# Patient Record
(demographics unavailable — no encounter records)

---

## 2017-01-04 NOTE — EDDOCDS
Nurse's Notes                                                                                     

[facility]                                                                         

Name: Sami Claros                                                                                 

Age: 45 yrs                                                                                       

Sex: Male                                                                                         

: 1971                                                                                   

MRN: [id number]                                                                                     

Arrival Date: 2017                                                                          

Time: 11:51                                                                                       

Account#: [id number]                                                                              

Bed PD                                                                                      

Private MD: [facility]                                                                  

Diagnosis: Moderate persistent asthma with (acute) exacerbation                                   

                                                                                                  

Presentation:                                                                                     

                                                                                             

11:53 Presenting complaint: Patient states: difficultly breathing and cough for a couple of   srm 

      days. hx of asthma. Presenting complaint: Patient states: nom fevers at home. Adult         

      Sepsis Screening: The patient does not have new or worsening altered mentation.             

      Patient's respiratory rate is less than 22. Systolic blood pressure is greater than         

      100. Patient has a qSOFA score of 0- Negative Sepsis Screen. Suicide/Homicide risk          

      assessment- the patient denies having any suicidal and/or homicidal ideations and does      

      not present with any other emotional, behavioral or mental health complaints.       

      Status: Patient is not a  or  dependent. Transition of care:          

      patient was not received from another setting of care.                                      

11:53 Acuity: JIM Level 4                                                                     [facility] 

11:53 Method Of Arrival: Walkin/Carried/Asstd                                                 [facility] 

                                                                                                  

Triage Assessment:                                                                                

11:55 General: Appears in no apparent distress, Behavior is appropriate for age, cooperative. srm 

      Pain: Pain currently is 5 out of 10 on a pain scale. HIV screening NA for this visit        

      Offered previously. Respiratory: Airway is patent Respiratory effort is even,               

      unlabored, Breath sounds are diminished bilaterally. dry cough.                             

                                                                                                  

Historical:                                                                                       

- Allergies: no known allergies;                                                                  

- Home Meds:                                                                                      

1. albuterol sulfate 90 mcg/actuation Inhl HFAA 1 puff every 4 hours as needed                  

2. Albuterol-Ipratropium Inhl 3 mL as needed                                                    

     (Last dose: 2017 08:30)                                                                

3. Calcium with D 2 tab nightly                                                                 

4. Flonase 50 mcg/actuation Nasal spsn 1 spray 2 times per day                                  

5. Qvar inhalation 1 puff 2 times per day                                                       

6. Singulair 10 mg Oral tab 1 tab once daily                                                    

7. Symbicort INH 2 puff daily                                                                   

8. Zyrtec 10 mg Oral tab 1 tab once daily                                                       

- PMHx: Asthma; Seasonal Allergies; Sleep Apnea w/ CPAP;                                          

- PSHx: Tonsillectomy; Gastric Bypass; Vasectomy; Left Shoulder Surgery; Left Ankle               

Surgery;                                                                                        

- Social history: Smoking status: Patient states was never smoker of tobacco. No                  

barriers to communication noted, The patient speaks fluent English, Speaks                      

appropriately for age.                                                                          

- Family history: Not pertinent.                                                                  

- : The pt / caregiver states he / she is not on anticoagulants. Home medication list             

is obtained from the patient.                                                                   

- Exposure Risk Screening:: None identified.                                                      

                                                                                                  

                                                                                                  

Screenin:36 Screening information is obtained from the patient. Fall risk: No risks identified.     ttb 

      Assistance ADL's: requires no assistance with activities of daily living. Abuse/DV          

      Screen: The patient / caregiver reports he/she is: not in a situation that causes fear,     

      pain or injury. Nutritional screening: No deficits noted. Advance Directives:               

      Currently, there is no health care proxy. home support is adequate.                         

                                                                                                  

Assessment:                                                                                       

14:36 Adult Sepsis Screening: The patient does not have new or worsening altered mentation.   ttb 

      Systolic blood pressure is greater than 100. Patient has a qSOFA score of 0- Negative       

      Sepsis Screen. General: Appears in no apparent distress, well nourished, well groomed,      

      Behavior is appropriate for age, cooperative. Pain: Denies pain. Neurological: Level of     

      Consciousness is awake, alert. EENT: Denies nasal congestion, nasal discharge.              

      Cardiovascular: Chest pain is denied. Respiratory: Airway is patent Respiratory effort      

      is even, unlabored, Denies shortness of breath. GI: Denies nausea, vomiting, pain.          

      Derm: Skin is normal.                                                                       

                                                                                                  

Vital Signs:                                                                                      

11:52  / 74; Pulse 60; Resp 24 S; Temp 97.3(O); Pulse Ox 100% on R/A; Weight 136.08 kg  dd6 

      (R); Height 6 ft. 0 in. (182.88 cm) (R);                                                    

14:33  / 59; Pulse 65; Resp 18; Temp 96.3(O); Pulse Ox 100% on R/A; Pain 0/10;          ttb 

11:52 Body Mass Index 40.69 (136.08 kg, 182.88 cm)                                            dd6 

                                                                                                  

Vitals:                                                                                           

11:52 Log In Time: 2017 at 11:50.                                                 dd6 

                                                                                                  

ED Course:                                                                                        

11:52 Patient visited by Jose Alberto Gillespie PCA.                                             dd6 

11:52 [facility] is Private Physician.                                              dd6 

11:52 Patient moved to Waiting                                                                dd6 

11:53 Patient moved to Pre RCE                                                                dd6 

11:54 Triage Initiated                                                                        srm 

12:23 Patient moved to Triage 1                                                               mlb1

12:51 Hai Sánchez PA is PHCP.                                                         btw 

12:51 Lilian Wheeler MD is Attending Physician.                                      btw 

12:51 Patient visited by Hai Sánchez PA.                                              btw 

13:00 Patient moved to PD2 /                                                                jrd 

13:28 Patient visited by Brittney Toure RN.                                                srm 

14:00 Patient visited by Qiana Canales PCA.                                              ct3 

14:31 [facility] is Referral Physician.                                             btw 

14:36 The patient / caregiver is instructed regarding the plan of care and ED course. Patient ttb 

      has correct armband on for positive identification.                                         

14:36 No IV's were initiated during this patient's visit. No procedures done that require     ttb 

      assistance.                                                                                 

                                                                                                  

Administered Medications:                                                                         

13:06 Drug: Albuterol-Ipratropium 1 neb [ipratropium-albuterol 0.5 mg-3 mg(2.5 mg base)/3 mL  kt1 

      nebulization soln (1 neb)] Route: Nebulizer;                                                

13:20 Follow up: Response: Nebulizer completed                                                sd7 

13:19 Drug: methylPREDNISolone Sodium Succinate 125 mg [methylprednisolone sodium succ 125 mg mlb1

      solution for injection (125 mg)] Route: IM; Site: left deltoid;                             

13:26 Drug: Albuterol-Ipratropium 1 neb [ipratropium-albuterol 0.5 mg-3 mg(2.5 mg base)/3 mL  sd7 

      nebulization soln (1 neb)] Route: Nebulizer;                                                

13:43 Follow up: Response: Nebulizer completed                                                sd7 

13:44 Drug: Albuterol-Ipratropium 1 neb [ipratropium-albuterol 0.5 mg-3 mg(2.5 mg base)/3 mL  sd7 

      nebulization soln (1 neb)] Route: Nebulizer;                                                

14:34 Follow up: Response: Nebulizer completed                                                sd7 

                                                                                                  

                                                                                                  

RT:                                                                                               

13:06 Initial Med Neb Given as ordered Patient was instructed and evaluated on procedure.     kt1 

      Respiratory: Breath sounds are clear bilaterally.                                           

13:26 Subsequent Med Neb Given as ordered Patient tolerated procedure well without adverse    sd7 

      effect. Respiratory: Breath sounds are diminished bilaterally. Breath sounds with           

      wheezes bilaterally. at expiration.                                                         

13:44 Subsequent Med Neb Given as ordered Patient tolerated procedure well without adverse    sd7 

      effect. Respiratory: Breath sounds are clear bilaterally.                                   

                                                                                                  

Order Results:                                                                                    

There are currently no results for this order.                                                    

Outcome:                                                                                          

14:31 Discharge ordered by Provider.                                                          btw 

14:36 Discharge Assessment: Patient awake, alert and oriented x 3. No cognitive and/or        ttb 

      functional deficits noted. Patient verbalized understanding of disposition                  

      instructions. Patient awake and alert. patient administered narcotics - no. The             

      following High Risk Discharge criteria are identified: None. Discharged to home             

      ambulatory. Condition: good Condition: stable Condition: improved. Discharge                

      instructions given to patient, Instructed on discharge instructions, follow up and          

      referral plans. medication usage, Demonstrated understanding of instructions,               

      medications, Pt was receptive of discharge instructions/ teaching. Prescriptions given      

      X 1. No special radiology studies were completed. Property :Personal belongings             

      accompany Pt.                                                                               

14:38 Patient left the ED.                                                                    ttb 

                                                                                                  

Signatures:                                                                                       

Brittney Toure RN RN srm Barney, Michael B RN                   RN   mlb1                                                 

Sabrina Rome                           kt1                                                  

Jose Alberto Gillespie, PCA                 PCA  dd6                                                  

Hai Sánchez PA                  PA   btw                                                  

Qiana Canales, PCA                  PCA  ct3                                                  

Ava Oliveros RN RN   ttb                                                  

Rosa M Elias,RT                   RT   sd7                                                  

Feliz Mario, PCA                   PCA  jrd                                                  

                                                                                                  

**************************************************************************************************

MTDD

## 2017-01-04 NOTE — EDDOCDS
Physician Documentation                                                                           

[facility]                                                                         

Name: Sami Claros                                                                                 

Age: 45 yrs                                                                                       

Sex: Male                                                                                         

: 1971                                                                                   

MRN: [id number]                                                                                     

Arrival Date: 2017                                                                          

Time: 11:51                                                                                       

Account#: [id number]                                                                              

Bed PD                                                                                      

Private MD: [facility]                                                                  

Disposition:                                                                                      

17 14:31 Discharged to Home/Self Care. Impression: Moderate persistent asthma with          

(acute) exacerbation.                                                                           

- Condition is Stable.                                                                            

- Discharge Instructions: Asthma, Adult, Asthma, Acute Bronchospasm.                              

- Prescriptions for Medrol (Nain) 4 mg Oral Tablets, Dose Pack - take 1 Pack by ORAL               

route as directed - follow package instructions; 1 packet.                                      

- Medication Reconciliation, Local Pharmacy Hours form.                                           

- Follow up: [facility]; When: 1 - 2 days; Reason: Further diagnostic                   

work-up, Recheck today's complaints, Continuance of care. Follow up: Emergency                  

Department; When: As soon as possible; Reason: Trouble breathing, Worsening of                  

conditions.                                                                                     

- Problem is an acute exacerbation.                                                               

- Symptoms have improved.                                                                         

                                                                                                  

                                                                                                  

                                                                                                  

Historical:                                                                                       

- Allergies: no known allergies;                                                                  

- Home Meds:                                                                                      

1. albuterol sulfate 90 mcg/actuation Inhl HFAA 1 puff every 4 hours as needed                  

2. Albuterol-Ipratropium Inhl 3 mL as needed                                                    

     (Last dose: 2017 08:30)                                                                

3. Calcium with D 2 tab nightly                                                                 

4. Flonase 50 mcg/actuation Nasal spsn 1 spray 2 times per day                                  

5. Qvar inhalation 1 puff 2 times per day                                                       

6. Singulair 10 mg Oral tab 1 tab once daily                                                    

7. Symbicort INH 2 puff daily                                                                   

8. Zyrtec 10 mg Oral tab 1 tab once daily                                                       

- PMHx: Asthma; Seasonal Allergies; Sleep Apnea w/ CPAP;                                          

- PSHx: Tonsillectomy; Gastric Bypass; Vasectomy; Left Shoulder Surgery; Left Ankle               

Surgery;                                                                                        

- Social history: Smoking status: Patient states was never smoker of tobacco. No                  

barriers to communication noted, The patient speaks fluent English, Speaks                      

appropriately for age.                                                                          

- Family history: Not pertinent.                                                                  

- : The pt / caregiver states he / she is not on anticoagulants. Home medication list             

is obtained from the patient.                                                                   

- Exposure Risk Screening:: None identified.                                                      

                                                                                                  

                                                                                                  

Vital Signs:                                                                                      

                                                                                             

11:52  / 74; Pulse 60; Resp 24 S; Temp 97.3(O); Pulse Ox 100% on R/A; Weight 136.08 kg  dd6 

      / 300.01 lbs (R); Height 6 ft. 0 in. (182.88 cm) (R);                                       

14:33  / 59; Pulse 65; Resp 18; Temp 96.3(O); Pulse Ox 100% on R/A; Pain 0/10;          ttb 

11:52 Body Mass Index 40.69 (136.08 kg, 182.88 cm)                                            dd6 

                                                                                                  

MDM:                                                                                              

13:01 methylPREDNISolone Sodium Succinate 125 mg IM once ordered.                             btw 

13:01 Albuterol-Ipratropium 1 neb Nebulizer every 20 minutes x3 ordered.                      btw 

13:01 Call Respiratory ordered.                                                               btw 

13:01 Call Respiratory complete.                                                              kt1 

13:01 Chest, 2 View (pa\E\lat) Ordered.                                                         EDMS

14:25 Financial registration complete.                                                        lg  

                                                                                                  

Administered Medications:                                                                         

13:06 Drug: Albuterol-Ipratropium 1 neb [ipratropium-albuterol 0.5 mg-3 mg(2.5 mg base)/3 mL  kt1 

      nebulization soln (1 neb)] Route: Nebulizer;                                                

13:20 Follow up: Response: Nebulizer completed                                                 

13:19 Drug: methylPREDNISolone Sodium Succinate 125 mg [methylprednisolone sodium succ 125 mg mlb1

      solution for injection (125 mg)] Route: IM; Site: left deltoid;                             

13:26 Drug: Albuterol-Ipratropium 1 neb [ipratropium-albuterol 0.5 mg-3 mg(2.5 mg base)/3 mL  sd7 

      nebulization soln (1 neb)] Route: Nebulizer;                                                

13:43 Follow up: Response: Nebulizer completed                                                 

13:44 Drug: Albuterol-Ipratropium 1 neb [ipratropium-albuterol 0.5 mg-3 mg(2.5 mg base)/3 mL  sd7 

      nebulization soln (1 neb)] Route: Nebulizer;                                                

14:34 Follow up: Response: Nebulizer completed                                                 

                                                                                                  

                                                                                                  

Signatures:                                                                                       

Dispatcher [employer]                           EDMS                                                 

Brittney Toure RN RN srm Ganter, LoriLee, Reg Reg lg [place]ter[place], Kristin                           kt1                                                  

Wolfenden, Hai, Ava Gillespie, OLIVER                      RN   ttb                                                  

Richard Valencia                       RN   mlb1                                                 

Rosa M Elias                     RT   sd7                                                  

                                                                                                  

**************************************************************************************************

MTDD

## 2017-01-04 NOTE — REP
CHEST, TWO VIEWS:

 

COMPARISON: 10/02/2016

 

There is no evidence of acute infiltrate.

 

No pleural effusion is seen.

 

The heart is normal in size.

 

The mediastinal silhouette is unremarkable.

 

The visualized osseous structures are intact.

 

IMPRESSION:

 

No acute pulmonary disease.

 

 

Signed by

Quoc Contreras MD 01/04/2017 05:22 P

## 2017-01-06 NOTE — EDDOCDS
Physician Documentation                                                                           

[facility]                                                                         

Name: Sami Claros                                                                                 

Age: 45 yrs                                                                                       

Sex: Male                                                                                         

: 1971                                                                                   

MRN: [id number]                                                                                     

Arrival Date: 2017                                                                          

Time: 11:51                                                                                       

Account#: [id number]                                                                              

Bed PD                                                                                      

Private MD: [facility]                                                                  

Disposition:                                                                                      

17 14:31 Discharged to Home/Self Care. Impression: Moderate persistent asthma with          

(acute) exacerbation.                                                                           

- Condition is Stable.                                                                            

- Discharge Instructions: Asthma, Adult, Asthma, Acute Bronchospasm.                              

- Prescriptions for Medrol (Nain) 4 mg Oral Tablets, Dose Pack - take 1 Pack by ORAL               

route as directed - follow package instructions; 1 packet.                                      

- Medication Reconciliation, Local Pharmacy Hours form.                                           

- Follow up: [facility]; When: 1 - 2 days; Reason: Further diagnostic                   

work-up, Recheck today's complaints, Continuance of care. Follow up: Emergency                  

Department; When: As soon as possible; Reason: Trouble breathing, Worsening of                  

conditions.                                                                                     

- Problem is an acute exacerbation.                                                               

- Symptoms have improved.                                                                         

                                                                                                  

                                                                                                  

                                                                                                  

Historical:                                                                                       

- Allergies: no known allergies;                                                                  

- Home Meds:                                                                                      

1. albuterol sulfate 90 mcg/actuation Inhl HFAA 1 puff every 4 hours as needed                  

2. Albuterol-Ipratropium Inhl 3 mL as needed                                                    

     (Last dose: 2017 08:30)                                                                

3. Calcium with D 2 tab nightly                                                                 

4. Flonase 50 mcg/actuation Nasal spsn 1 spray 2 times per day                                  

5. Qvar inhalation 1 puff 2 times per day                                                       

6. Singulair 10 mg Oral tab 1 tab once daily                                                    

7. Symbicort INH 2 puff daily                                                                   

8. Zyrtec 10 mg Oral tab 1 tab once daily                                                       

- PMHx: Asthma; Seasonal Allergies; Sleep Apnea w/ CPAP;                                          

- PSHx: Tonsillectomy; Gastric Bypass; Vasectomy; Left Shoulder Surgery; Left Ankle               

Surgery;                                                                                        

- Social history: Smoking status: Patient states was never smoker of tobacco. No                  

barriers to communication noted, The patient speaks fluent English, Speaks                      

appropriately for age.                                                                          

- Family history: Not pertinent.                                                                  

- : The pt / caregiver states he / she is not on anticoagulants. Home medication list             

is obtained from the patient.                                                                   

- Exposure Risk Screening:: None identified.                                                      

                                                                                                  

                                                                                                  

Vital Signs:                                                                                      

                                                                                             

11:52  / 74; Pulse 60; Resp 24 S; Temp 97.3(O); Pulse Ox 100% on R/A; Weight 136.08 kg  dd6 

      / 300.01 lbs (R); Height 6 ft. 0 in. (182.88 cm) (R);                                       

14:33  / 59; Pulse 65; Resp 18; Temp 96.3(O); Pulse Ox 100% on R/A; Pain 0/10;          ttb 

11:52 Body Mass Index 40.69 (136.08 kg, 182.88 cm)                                            dd6 

                                                                                                  

MDM:                                                                                              

13:01 methylPREDNISolone Sodium Succinate 125 mg IM once ordered.                             btw 

13:01 Albuterol-Ipratropium 1 neb Nebulizer every 20 minutes x3 ordered.                      btw 

13:01 Call Respiratory ordered.                                                               btw 

13:01 Call Respiratory complete.                                                              kt1 

13:01 Chest, 2 View (pa\E\lat) Ordered.                                                         EDMS

14:25 Financial registration complete.                                                        lg  

                                                                                             

08:37 T-Sheet-- Draft Copy was scanned into boosk and attached to record.                   gb  

10:16 [facility] Payment Agreement was scanned into boosk and attached to record.               lg  

                                                                                                  

Administered Medications:                                                                         

                                                                                             

13:06 Drug: Albuterol-Ipratropium 1 neb [ipratropium-albuterol 0.5 mg-3 mg(2.5 mg base)/3 mL  kt1 

      nebulization soln (1 neb)] Route: Nebulizer;                                                

13:20 Follow up: Response: Nebulizer completed                                                 

13:19 Drug: methylPREDNISolone Sodium Succinate 125 mg [methylprednisolone sodium succ 125 mg mlb1

      solution for injection (125 mg)] Route: IM; Site: left deltoid;                             

13:26 Drug: Albuterol-Ipratropium 1 neb [ipratropium-albuterol 0.5 mg-3 mg(2.5 mg base)/3 mL  sd7 

      nebulization soln (1 neb)] Route: Nebulizer;                                                

13:43 Follow up: Response: Nebulizer completed                                                 

13:44 Drug: Albuterol-Ipratropium 1 neb [ipratropium-albuterol 0.5 mg-3 mg(2.5 mg base)/3 mL  sd7 

      nebulization soln (1 neb)] Route: Nebulizer;                                                

14:34 Follow up: Response: Nebulizer completed                                                sd7 

                                                                                                  

                                                                                                  

Signatures:                                                                                       

Dispatcher [employer]                           Brittney Ivan, RN                    RN   srm                                                  

Cristal Hernández, Reg                  Reg  gb                                                   

Minerva Mays, Reg                    Reg  lg                                                   

Sabirna Rome                           kt1                                                  

Hai Sánchez PA PA btw Conner, Teresa, RN                      RN   ttRichard Cuellar                       RN   mlb1                                                 

Rosa M Elias                     RT   sd7                                                  

                                                                                                  

The chart was reviewed and I authenticate all verbal orders and agree with the evaluation and 
treatment provided.Attachments:

                                                                                             

08:37 T-Sheet-- Draft Copy                                                                    gb  

10:16 [facility] Payment Agreement                                                                lg  

                                                                                                  

**************************************************************************************************



*** Chart Complete ***
MTDD

## 2017-01-06 NOTE — EDDOCDS
Nurse's Notes                                                                                     

[facility]                                                                         

Name: Sami Claros                                                                                 

Age: 45 yrs                                                                                       

Sex: Male                                                                                         

: 1971                                                                                   

MRN: [id number]                                                                                     

Arrival Date: 2017                                                                          

Time: 11:51                                                                                       

Account#: [id number]                                                                              

Bed PD                                                                                      

Private MD: [facility]                                                                  

Diagnosis: Moderate persistent asthma with (acute) exacerbation                                   

                                                                                                  

Presentation:                                                                                     

                                                                                             

11:53 Presenting complaint: Patient states: difficultly breathing and cough for a couple of   srm 

      days. hx of asthma. Presenting complaint: Patient states: nom fevers at home. Adult         

      Sepsis Screening: The patient does not have new or worsening altered mentation.             

      Patient's respiratory rate is less than 22. Systolic blood pressure is greater than         

      100. Patient has a qSOFA score of 0- Negative Sepsis Screen. Suicide/Homicide risk          

      assessment- the patient denies having any suicidal and/or homicidal ideations and does      

      not present with any other emotional, behavioral or mental health complaints.       

      Status: Patient is not a  or  dependent. Transition of care:          

      patient was not received from another setting of care.                                      

11:53 Acuity: JIM Level 4                                                                     [facility] 

11:53 Method Of Arrival: Walkin/Carried/Asstd                                                 [facility] 

                                                                                                  

Triage Assessment:                                                                                

11:55 General: Appears in no apparent distress, Behavior is appropriate for age, cooperative. srm 

      Pain: Pain currently is 5 out of 10 on a pain scale. HIV screening NA for this visit        

      Offered previously. Respiratory: Airway is patent Respiratory effort is even,               

      unlabored, Breath sounds are diminished bilaterally. dry cough.                             

                                                                                                  

Historical:                                                                                       

- Allergies: no known allergies;                                                                  

- Home Meds:                                                                                      

1. albuterol sulfate 90 mcg/actuation Inhl HFAA 1 puff every 4 hours as needed                  

2. Albuterol-Ipratropium Inhl 3 mL as needed                                                    

     (Last dose: 2017 08:30)                                                                

3. Calcium with D 2 tab nightly                                                                 

4. Flonase 50 mcg/actuation Nasal spsn 1 spray 2 times per day                                  

5. Qvar inhalation 1 puff 2 times per day                                                       

6. Singulair 10 mg Oral tab 1 tab once daily                                                    

7. Symbicort INH 2 puff daily                                                                   

8. Zyrtec 10 mg Oral tab 1 tab once daily                                                       

- PMHx: Asthma; Seasonal Allergies; Sleep Apnea w/ CPAP;                                          

- PSHx: Tonsillectomy; Gastric Bypass; Vasectomy; Left Shoulder Surgery; Left Ankle               

Surgery;                                                                                        

- Social history: Smoking status: Patient states was never smoker of tobacco. No                  

barriers to communication noted, The patient speaks fluent English, Speaks                      

appropriately for age.                                                                          

- Family history: Not pertinent.                                                                  

- : The pt / caregiver states he / she is not on anticoagulants. Home medication list             

is obtained from the patient.                                                                   

- Exposure Risk Screening:: None identified.                                                      

                                                                                                  

                                                                                                  

Screenin:36 Screening information is obtained from the patient. Fall risk: No risks identified.     ttb 

      Assistance ADL's: requires no assistance with activities of daily living. Abuse/DV          

      Screen: The patient / caregiver reports he/she is: not in a situation that causes fear,     

      pain or injury. Nutritional screening: No deficits noted. Advance Directives:               

      Currently, there is no health care proxy. home support is adequate.                         

                                                                                                  

Assessment:                                                                                       

14:36 Adult Sepsis Screening: The patient does not have new or worsening altered mentation.   ttb 

      Systolic blood pressure is greater than 100. Patient has a qSOFA score of 0- Negative       

      Sepsis Screen. General: Appears in no apparent distress, well nourished, well groomed,      

      Behavior is appropriate for age, cooperative. Pain: Denies pain. Neurological: Level of     

      Consciousness is awake, alert. EENT: Denies nasal congestion, nasal discharge.              

      Cardiovascular: Chest pain is denied. Respiratory: Airway is patent Respiratory effort      

      is even, unlabored, Denies shortness of breath. GI: Denies nausea, vomiting, pain.          

      Derm: Skin is normal.                                                                       

                                                                                                  

Vital Signs:                                                                                      

11:52  / 74; Pulse 60; Resp 24 S; Temp 97.3(O); Pulse Ox 100% on R/A; Weight 136.08 kg  dd6 

      (R); Height 6 ft. 0 in. (182.88 cm) (R);                                                    

14:33  / 59; Pulse 65; Resp 18; Temp 96.3(O); Pulse Ox 100% on R/A; Pain 0/10;          ttb 

11:52 Body Mass Index 40.69 (136.08 kg, 182.88 cm)                                            dd6 

                                                                                                  

Vitals:                                                                                           

11:52 Log In Time: 2017 at 11:50.                                                 dd6 

                                                                                                  

ED Course:                                                                                        

11:52 Patient visited by Jose Alberto Gillespie PCA.                                             dd6 

11:52 [facility] is Private Physician.                                              dd6 

11:52 Patient moved to Waiting                                                                dd6 

11:53 Patient moved to Pre RCE                                                                dd6 

11:54 Triage Initiated                                                                        srm 

12:23 Patient moved to Triage 1                                                               mlb1

12:51 Hai Sánchez PA is PHCP.                                                         btw 

12:51 Lilian Wheeler MD is Attending Physician.                                      btw 

12:51 Patient visited by Hai Sánchez PA.                                              btw 

13:00 Patient moved to PD /                                                                jrd 

13:28 Patient visited by Brittney Toure, OLIVER.                                                srm 

14:00 Patient visited by Qiana Canales PCA.                                              ct3 

14:31 [facility] is Referral Physician.                                             btw 

14:36 The patient / caregiver is instructed regarding the plan of care and ED course. Patient ttb 

      has correct armband on for positive identification.                                         

14:36 No IV's were initiated during this patient's visit. No procedures done that require     ttb 

      assistance.                                                                                 

14:56 Chest, 2 View (pa\E\lat) Returned.                                                        EDMS

                                                                                             

08:37 T-Sheet-- Draft Copy was scanned into [employer] and attached to record.                     

10:16 [facility] Payment Agreement was scanned into [employer] and attached to record.               lg  

                                                                                                  

Administered Medications:                                                                         

                                                                                             

13:06 Drug: Albuterol-Ipratropium 1 neb [ipratropium-albuterol 0.5 mg-3 mg(2.5 mg base)/3 mL  kt1 

      nebulization soln (1 neb)] Route: Nebulizer;                                                

13:20 Follow up: Response: Nebulizer completed                                                sd7 

13:19 Drug: methylPREDNISolone Sodium Succinate 125 mg [methylprednisolone sodium succ 125 mg mlb1

      solution for injection (125 mg)] Route: IM; Site: left deltoid;                             

13:26 Drug: Albuterol-Ipratropium 1 neb [ipratropium-albuterol 0.5 mg-3 mg(2.5 mg base)/3 mL  sd7 

      nebulization soln (1 neb)] Route: Nebulizer;                                                

13:43 Follow up: Response: Nebulizer completed                                                sd7 

13:44 Drug: Albuterol-Ipratropium 1 neb [ipratropium-albuterol 0.5 mg-3 mg(2.5 mg base)/3 mL  sd7 

      nebulization soln (1 neb)] Route: Nebulizer;                                                

14:34 Follow up: Response: Nebulizer completed                                                sd7 

                                                                                                  

                                                                                                  

RT:                                                                                               

13:06 Initial Med Neb Given as ordered Patient was instructed and evaluated on procedure.     kt1 

      Respiratory: Breath sounds are clear bilaterally.                                           

13:26 Subsequent Med Neb Given as ordered Patient tolerated procedure well without adverse    sd7 

      effect. Respiratory: Breath sounds are diminished bilaterally. Breath sounds with           

      wheezes bilaterally. at expiration.                                                         

13:44 Subsequent Med Neb Given as ordered Patient tolerated procedure well without adverse    sd7 

      effect. Respiratory: Breath sounds are clear bilaterally.                                   

                                                                                                  

Order Results:                                                                                    

                                                                                                  

Radiology Order: Chest, 2 View (pa\E\lat)                                                         

      Test: Chest, 2 View (pa\E\lat)                                                              

      REASON FOR EXAMINATION: diminished on LEFT;Shortness of Breath; CHEST, TWO VIEWS:; ;        

      COMPARISON: 10/02/2016; ; There is no evidence of acute infiltrate.; ; No pleural           

      effusion is seen.; ; The heart is normal in size.; ; The mediastinal silhouette is          

      unremarkable.; ; The visualized osseous structures are intact.; ; IMPRESSION:; ; No         

      acute pulmonary disease.; ; ; Signed by; Quoc Contreras MD 2017 05:22 P;                

Outcome:                                                                                          

14:31 Discharge ordered by Provider.                                                          btw 

14:36 Discharge Assessment: Patient awake, alert and oriented x 3. No cognitive and/or        ttb 

      functional deficits noted. Patient verbalized understanding of disposition                  

      instructions. Patient awake and alert. patient administered narcotics - no. The             

      following High Risk Discharge criteria are identified: None. Discharged to home             

      ambulatory. Condition: good Condition: stable Condition: improved. Discharge                

      instructions given to patient, Instructed on discharge instructions, follow up and          

      referral plans. medication usage, Demonstrated understanding of instructions,               

      medications, Pt was receptive of discharge instructions/ teaching. Prescriptions given      

      X 1. No special radiology studies were completed. Property :Personal belongings             

      accompany Pt.                                                                               

14:38 Patient left the ED.                                                                    ttb 

                                                                                                  

Signatures:                                                                                       

Dispatcher [employer]                           EDMS                                                 

Brittney Toure, RN                    RN   [facility]                                                  

Cristal Hernández, Reg                  Reg  gb                                                   

Minerva Mays, Reg                    Reg  lg                                                   

Richard Valencia RN                   RN   mlb1                                                 

Sabrina Rome                           kt1                                                  

Jose Alberto Gillespie, PCA                 PCA  dd6                                                  

Hai Sánchez PA                  PA   btw                                                  

Qiana Canales, PCA                  PCA  ct3                                                  

Ava Oliveros RN RN   ttb                                                  

Curt,Rosa M,RT                   RT   sd7                                                  

Feliz Mario, PCA                   PCA  jrd                                                  

                                                                                                  

**************************************************************************************************



*** Chart Complete ***
MTDD

## 2017-01-06 NOTE — EDDOCDS
Physician Documentation                                                                           

[facility]                                                                         

Name: Sami Claros                                                                                 

Age: 45 yrs                                                                                       

Sex: Male                                                                                         

: 1971                                                                                   

MRN: [id number]                                                                                     

Arrival Date: 2017                                                                          

Time: 11:51                                                                                       

Account#: [id number]                                                                              

Bed PD                                                                                      

Private MD: [facility]                                                                  

Disposition:                                                                                      

17 14:31 Discharged to Home/Self Care. Impression: Moderate persistent asthma with          

(acute) exacerbation.                                                                           

- Condition is Stable.                                                                            

- Discharge Instructions: Asthma, Adult, Asthma, Acute Bronchospasm.                              

- Prescriptions for Medrol (Nain) 4 mg Oral Tablets, Dose Pack - take 1 Pack by ORAL               

route as directed - follow package instructions; 1 packet.                                      

- Medication Reconciliation, Local Pharmacy Hours form.                                           

- Follow up: [facility]; When: 1 - 2 days; Reason: Further diagnostic                   

work-up, Recheck today's complaints, Continuance of care. Follow up: Emergency                  

Department; When: As soon as possible; Reason: Trouble breathing, Worsening of                  

conditions.                                                                                     

- Problem is an acute exacerbation.                                                               

- Symptoms have improved.                                                                         

                                                                                                  

                                                                                                  

                                                                                                  

Historical:                                                                                       

- Allergies: no known allergies;                                                                  

- Home Meds:                                                                                      

1. albuterol sulfate 90 mcg/actuation Inhl HFAA 1 puff every 4 hours as needed                  

2. Albuterol-Ipratropium Inhl 3 mL as needed                                                    

     (Last dose: 2017 08:30)                                                                

3. Calcium with D 2 tab nightly                                                                 

4. Flonase 50 mcg/actuation Nasal spsn 1 spray 2 times per day                                  

5. Qvar inhalation 1 puff 2 times per day                                                       

6. Singulair 10 mg Oral tab 1 tab once daily                                                    

7. Symbicort INH 2 puff daily                                                                   

8. Zyrtec 10 mg Oral tab 1 tab once daily                                                       

- PMHx: Asthma; Seasonal Allergies; Sleep Apnea w/ CPAP;                                          

- PSHx: Tonsillectomy; Gastric Bypass; Vasectomy; Left Shoulder Surgery; Left Ankle               

Surgery;                                                                                        

- Social history: Smoking status: Patient states was never smoker of tobacco. No                  

barriers to communication noted, The patient speaks fluent English, Speaks                      

appropriately for age.                                                                          

- Family history: Not pertinent.                                                                  

- : The pt / caregiver states he / she is not on anticoagulants. Home medication list             

is obtained from the patient.                                                                   

- Exposure Risk Screening:: None identified.                                                      

                                                                                                  

                                                                                                  

Vital Signs:                                                                                      

                                                                                             

11:52  / 74; Pulse 60; Resp 24 S; Temp 97.3(O); Pulse Ox 100% on R/A; Weight 136.08 kg  dd6 

      / 300.01 lbs (R); Height 6 ft. 0 in. (182.88 cm) (R);                                       

14:33  / 59; Pulse 65; Resp 18; Temp 96.3(O); Pulse Ox 100% on R/A; Pain 0/10;          ttb 

11:52 Body Mass Index 40.69 (136.08 kg, 182.88 cm)                                            dd6 

                                                                                                  

MDM:                                                                                              

13:01 methylPREDNISolone Sodium Succinate 125 mg IM once ordered.                             btw 

13:01 Albuterol-Ipratropium 1 neb Nebulizer every 20 minutes x3 ordered.                      btw 

13:01 Call Respiratory ordered.                                                               btw 

13:01 Call Respiratory complete.                                                              kt1 

13:01 Chest, 2 View (pa\E\lat) Ordered.                                                         EDMS

14:25 Financial registration complete.                                                        lg  

                                                                                             

08:37 T-Sheet-- Draft Copy was scanned into e-channel and attached to record.                   gb  

10:16 [facility] Payment Agreement was scanned into e-channel and attached to record.               lg  

                                                                                                  

Administered Medications:                                                                         

                                                                                             

13:06 Drug: Albuterol-Ipratropium 1 neb [ipratropium-albuterol 0.5 mg-3 mg(2.5 mg base)/3 mL  kt1 

      nebulization soln (1 neb)] Route: Nebulizer;                                                

13:20 Follow up: Response: Nebulizer completed                                                 

13:19 Drug: methylPREDNISolone Sodium Succinate 125 mg [methylprednisolone sodium succ 125 mg mlb1

      solution for injection (125 mg)] Route: IM; Site: left deltoid;                             

13:26 Drug: Albuterol-Ipratropium 1 neb [ipratropium-albuterol 0.5 mg-3 mg(2.5 mg base)/3 mL  sd7 

      nebulization soln (1 neb)] Route: Nebulizer;                                                

13:43 Follow up: Response: Nebulizer completed                                                 

13:44 Drug: Albuterol-Ipratropium 1 neb [ipratropium-albuterol 0.5 mg-3 mg(2.5 mg base)/3 mL  sd7 

      nebulization soln (1 neb)] Route: Nebulizer;                                                

14:34 Follow up: Response: Nebulizer completed                                                sd7 

                                                                                                  

                                                                                                  

Signatures:                                                                                       

Dispatcher [employer]                           Brittney Ivan, RN                    RN   srm                                                  

Cristal Hernández, Reg                  Reg  gb                                                   

Minerva Mays, Reg                    Reg  lg                                                   

Sabrina Rome                           kt1                                                  

Hai Sánchez PA PA btw Conner, Teresa, RN                      RN   ttRichard Cuellar                       RN   mlb1                                                 

Rosa M Elias                     RT   sd7                                                  

                                                                                                  

The chart was reviewed and I authenticate all verbal orders and agree with the evaluation and 
treatment provided.Attachments:

                                                                                             

08:37 T-Sheet-- Draft Copy                                                                    gb  

10:16 [facility] Payment Agreement                                                                lg  

                                                                                                  

**************************************************************************************************



*** Chart Complete ***
MTDD

## 2017-01-14 NOTE — EDDOCDS
Physician Documentation                                                                           

[facility]                                                                         

Name: Sami Claros                                                                                 

Age: 45 yrs                                                                                       

Sex: Male                                                                                         

: 1971                                                                                   

MRN: [id number]                                                                                     

Arrival Date: 2017                                                                          

Time: 13:26                                                                                       

Account#: [id number]                                                                              

Bed TR7                                                                                           

Private MD: [facility]                                                                  

Disposition:                                                                                      

17 17:26 Discharged to Home/Self Care. Impression: Dizziness and giddiness.                 

- Condition is Stable.                                                                            

                                                                                                  

                                                                                                  

- Medication Reconciliation, Local Pharmacy Hours form.                                           

- Follow up: Emergency Department; When: As soon as possible; Reason: Worsening of                

conditions. Follow up: Private Physician; When: 2 - 3 days; Reason: Recheck today's             

complaints.                                                                                     

- Problem is new.                                                                                 

- Symptoms are unchanged.                                                                         

                                                                                                  

                                                                                                  

                                                                                                  

Historical:                                                                                       

- Allergies: no known allergies;                                                                  

- Home Meds:                                                                                      

1. albuterol sulfate 90 mcg/actuation Inhl HFAA 1 puff every 4 hours as needed                  

2. Albuterol-Ipratropium Inhl 3 mL as needed                                                    

3. Calcium with D 2 tab nightly                                                                 

4. Flonase 50 mcg/actuation Nasal spsn 1 spray 2 times per day                                  

5. Qvar inhalation 1 puff 2 times per day                                                       

6. Singulair 10 mg Oral tab 1 tab once daily                                                    

7. Symbicort INH 2 puff daily                                                                   

8. Zyrtec 10 mg Oral tab 1 tab once daily                                                       

- PMHx: Asthma; Seasonal Allergies; Sleep Apnea w/ CPAP;                                          

- PSHx: Tonsillectomy; Gastric Bypass; Vasectomy; Left Shoulder Surgery; Left Ankle               

Surgery;                                                                                        

- Social history: Smoking status: Patient states former smoker of tobacco. No barriers            

to communication noted, The patient speaks fluent English, Speaks appropriately for             

age.                                                                                            

- Family history: Not pertinent.                                                                  

- : The pt / caregiver states he / she is not on anticoagulants. Home medication list             

is obtained from the patient.                                                                   

- Exposure Risk Screening:: None identified.                                                      

                                                                                                  

                                                                                                  

Vital Signs:                                                                                      

                                                                                             

13:27  / 62; Pulse 52; Resp 18 S; Temp 97.1(O); Pulse Ox 100% on R/A; Weight 136.08 kg  dd6 

      / 300.01 lbs (R); Height 6 ft. 0 in. (182.88 cm) (R);                                       

15:56  / 58 (auto/);                                                                    hs1 

15:58 Pulse 48 MON;                                                                           hs1 

16:10 Pulse 50 MON;                                                                           hs1 

16:11  / 62 (auto/);                                                                    hs1 

16:40 Pulse 48 MON;                                                                           hs1 

16:41  / 66 (auto/);                                                                    hs1 

17:10 Pulse 48 MON;                                                                           hs1 

17:11  / 66 (auto/);                                                                    hs1 

17:24  / 83 Supine; Pulse 53;                                                           ms18

17:24  / 83 Standing; Pulse 53;                                                         ms18

17:25 Pulse 50 MON;                                                                           hs1 

17:26  / 67 (auto/);                                                                    hs1 

17:26 Pulse 52 MON; Resp 18; Temp 97.2; Pulse Ox 98% ; Pain 0/10;                             hs1 

17:26  / 67 (auto/);                                                                    hs1 

13:27 Body Mass Index 40.69 (136.08 kg, 182.88 cm)                                            dd6 

                                                                                                  

MDM:                                                                                              

13:46 ECG WITH READING ER PHYS+CARDIAG ordered.                                               EDMS

15:11 CBC with Diff Ordered.                                                                  EDMS

15:11 Troponin Ordered.                                                                       EDMS

15:11 BMP Ordered.                                                                            EDMS

15:11 Liver Profile Ordered.                                                                  EDMS

15:11 CK-MB Ordered.                                                                          EDMS

15:12 CT Head Without Contrast Ordered.                                                       EDMS

15:15 Cardiac Monitor ordered.                                                                jk8 

15:18 IV Saline Lock ordered.                                                                 jk8 

16:23 Financial registration complete.                                                        zo  

16:24 [facility] Payment Agreement was scanned into [employer] and attached to record.               zo  

16:54 CBC with Diff Reviewed.                                                                 jk8 

16:54 BMP Reviewed.                                                                           jk8 

16:54 Liver Profile Reviewed.                                                                 jk8 

16:54 Troponin Reviewed.                                                                      jk8 

16:54 CK-MB Reviewed.                                                                         jk8 

16:54 CT Head Without Contrast Reviewed.                                                      jk8 

                                                                                                  

Signatures:                                                                                       

Dispatcher [employer]                           EDMS                                                 

Richard Valencia RN                   RN   mlb1                                                 

Nash Martin Hannah, RN                    RN   hs1                                                  

Feliz Flanagan PA-C PA-C jk8                                                  

                                                                                                  

The chart was reviewed and I authenticate all verbal orders and agree with the evaluation and 
treatment provided.Attachments:

16:24 [facility] Payment Agreement                                                                zo  

                                                                                                  

**************************************************************************************************

MTDD

## 2017-01-14 NOTE — REP
Clinical:  Syncope.

 

Comparison: 02/26/2016.

 

Findings:

The ventricles, sulci, and cisterns are normal in position and appearance.

Gray-white differentiation is maintained.  No acute intracranial hemorrhage,

mass/mass effect, pathology or trauma/injury.  No evidence for acute infarction.

No extra-axial fluid collection.  Calvarium is intact.  Paranasal sinuses and

mastoid air cells are clear.

 

Impression:

Normal noncontrast head CT.

No evidence for acute intracranial pathology or trauma/injury.

 

 

Signed by

Sami Hartman MD 01/14/2017 03:25 P

## 2017-01-14 NOTE — EDDOCDS
Nurse's Notes                                                                                     

[facility]                                                                         

Name: Sami Claros                                                                                 

Age: 45 yrs                                                                                       

Sex: Male                                                                                         

: 1971                                                                                   

MRN: [id number]                                                                                     

Arrival Date: 2017                                                                          

Time: 13:26                                                                                       

Account#: [id number]                                                                              

Bed TR7                                                                                           

Private MD: [facility], [place]                                                                  

Diagnosis: Dizziness and giddiness                                                                

                                                                                                  

Presentation:                                                                                     

                                                                                             

13:41 Presenting complaint: Patient states: Dizziness and nausea began yesterday. Adult       mlb1

      Sepsis Screening: The patient does not have new or worsening altered mentation.             

      Patient's respiratory rate is less than 22. Systolic blood pressure is greater than         

      100. Patient has a qSOFA score of 0- Negative Sepsis Screen. Suicide/Homicide risk          

      assessment- the patient denies having any suicidal and/or homicidal ideations and does      

      not present with any other emotional, behavioral or mental health complaints.       

      Status: Patient is not a  or  dependent. Transition of care:          

      patient was not received from another setting of care.                                      

13:41 Acuity: JIM Level 3                                                                     mlb1

13:41 Method Of Arrival: Walkin/Carried/Asstd                                                 mlb1

                                                                                                  

Triage Assessment:                                                                                

13:43 General: Appears in no apparent distress, comfortable, Behavior is appropriate for age, mlb1

      cooperative. Pain: Denies pain. HIV screening NA for this visit Offered previously.         

      Neurological: Reports dizziness. GI: Reports nausea. Derm: Skin is pink, warm & dry.      

      normal.                                                                                     

                                                                                                  

Historical:                                                                                       

- Allergies: no known allergies;                                                                  

- Home Meds:                                                                                      

1. albuterol sulfate 90 mcg/actuation Inhl HFAA 1 puff every 4 hours as needed                  

2. Albuterol-Ipratropium Inhl 3 mL as needed                                                    

3. Calcium with D 2 tab nightly                                                                 

4. Flonase 50 mcg/actuation Nasal spsn 1 spray 2 times per day                                  

5. Qvar inhalation 1 puff 2 times per day                                                       

6. Singulair 10 mg Oral tab 1 tab once daily                                                    

7. Symbicort INH 2 puff daily                                                                   

8. Zyrtec 10 mg Oral tab 1 tab once daily                                                       

- PMHx: Asthma; Seasonal Allergies; Sleep Apnea w/ CPAP;                                          

- PSHx: Tonsillectomy; Gastric Bypass; Vasectomy; Left Shoulder Surgery; Left Ankle               

Surgery;                                                                                        

- Social history: Smoking status: Patient states former smoker of tobacco. No barriers            

to communication noted, The patient speaks fluent English, Speaks appropriately for             

age.                                                                                            

- Family history: Not pertinent.                                                                  

- : The pt / caregiver states he / she is not on anticoagulants. Home medication list             

is obtained from the patient.                                                                   

- Exposure Risk Screening:: None identified.                                                      

                                                                                                  

                                                                                                  

Screenin:22 Screening information is obtained from the patient. Fall risk: No risks identified.     hs1 

      Assistance ADL's: requires no assistance with activities of daily living. Abuse/DV          

      Screen: The patient / caregiver reports he/she is: not in a situation that causes fear,     

      pain or injury. Nutritional screening: No deficits noted. Advance Directives: There is      

      no active DNR order. home support is adequate.                                              

                                                                                                  

Assessment:                                                                                       

13:50 General: Appears in no apparent distress, Behavior is appropriate for age, cooperative. hs1 

      Neurological: No deficits noted. Cardiovascular: Rhythm is sinus bradycardia No ectopy.     

      Cardiovascular: Chest pain is denied. Respiratory: Airway is patent Respiratory effort      

      is even, unlabored, Respiratory pattern is regular, symmetrical. GI: Abdomen is flat,       

      non- distended obese. Derm: Skin is pink, warm & dry. normal.                             

                                                                                                  

Vital Signs:                                                                                      

13:27  / 62; Pulse 52; Resp 18 S; Temp 97.1(O); Pulse Ox 100% on R/A; Weight 136.08 kg  dd6 

      (R); Height 6 ft. 0 in. (182.88 cm) (R);                                                    

15:56  / 58 (auto/);                                                                    hs1 

15:58 Pulse 48 MON;                                                                           hs1 

16:10 Pulse 50 MON;                                                                           hs1 

16:11  / 62 (auto/);                                                                    hs1 

16:40 Pulse 48 MON;                                                                           hs1 

16:41  / 66 (auto/);                                                                    hs1 

17:10 Pulse 48 MON;                                                                           hs1 

17:11  / 66 (auto/);                                                                    hs1 

17:24  / 83 Supine; Pulse 53;                                                           ms18

17:24  / 83 Standing; Pulse 53;                                                         ms18

17:25 Pulse 50 MON;                                                                           hs1 

17:26  / 67 (auto/);                                                                    hs1 

17:26 Pulse 52 MON; Resp 18; Temp 97.2; Pulse Ox 98% ; Pain 0/10;                             hs1 

17:26  / 67 (auto/);                                                                    hs1 

13:27 Body Mass Index 40.69 (136.08 kg, 182.88 cm)                                            dd6 

                                                                                                  

Vitals:                                                                                           

13:27 Log In Time: 2017 at 13:25.                                                 dd6 

                                                                                                  

ED Course:                                                                                        

13:27 Patient visited by Jose Alberto Gillespie PCA.                                             dd6 

13:27 [facility], [place] is Private Physician.                                              dd6 

13:27 Patient moved to Waiting                                                                dd6 

13:29 Patient moved to Pre RCE                                                                dd6 

13:41 Patient visited by Richard Valencia RN.                                               mlb1

13:42 Triage Initiated                                                                        mlb1

13:45 Patient moved to PR2 / 26                                                               mlb1

13:54 Patient moved to Pre RCE                                                                bcj 

14:46 Patient moved to Triage 2                                                               mlb1

14:49 Feliz Flanagan PA-C is PHCP.                                                          jk8 

14:49 Lilian Wheeler MD is Attending Physician.                                      jk8 

14:49 Patient visited by Feliz Flanagan PA-C.                                               jk8 

14:57 Patient moved to I5 / M5                                                                rs6 

15:00 The patient / caregiver is instructed regarding the plan of care and ED course.         hs1 

15:17 Patient moved to 13                                                                     dls 

15:54 CT Head Without Contrast Returned.                                                      EDMS

16:14 Patient visited by Lena Stapleton RN.                                                hs1 

16:23 [facility] Payment Agreement was scanned into [employer] and attached to record.               zo  

17:45 Discontinued IV lock intact, bleeding controlled, pressure dressing applied, No         hs1 

      redness/swelling at site. No procedures done that require assistance.                       

17:54 Patient moved to TR2                                                                    dy  

18:06 Patient moved to TR7                                                                    ms18

                                                                                                  

Order Results:                                                                                    

Lab Order: CBC with Diff; SPEC'M 17 15:57                                                   

      Test: WHITE BLOOD COUNT; Value: 5.8; Range: 4.0-10.0; Units: K/mm3; Status: F               

      Test: RED BLOOD COUNT; Value: 4.61; Range: 4.30-6.10; Units: M/mm3; Status: F               

      Test: HEMOGLOBIN; Value: 14.0; Range: 14.0-18.0; Units: g/dl; Status: F                     

      Test: HEMATOCRIT; Value: 43.3; Range: 42.0-52.0; Units: %; Status: F                        

      Test: MEAN CORPUSCULAR VOLUME; Value: 93.8; Range: 80.0-96.0; Units: fl; Status: F          

      Test: MEAN CORPUSCULAR HEMOGLOBIN; Value: 30.2; Range: 27.0-33.0; Units: pg; Status: F      

      Test: MEAN CORPUSCULAR HGB CONC; Value: 32.3; Range: 32.0-36.5; Units: g/dl; Status: F      

      Test: RED CELL DISTRIBUTION WIDTH; Value: 13.3; Range: 11.5-14.5; Units: %; Status: F       

      Test: PLATELET COUNT, AUTOMATED; Value: 245; Range: 150-450; Units: k/mm3; Status: F        

      Test: NEUTROPHILS %; Value: 50.7; Range: 36.0-66.0; Units: %; Status: F                     

      Test: LYMPH %; Value: 34.8; Range: 24.0-44.0; Units: %; Status: F                           

      Test: MONO %; Value: 6.7; Range: 0.0-5.0; Abnormal: Above high normal; Units: %;            

      Status: F                                                                                   

      Test: EOS %; Value: 3.3; Range: 0.0-3.0; Abnormal: Above high normal; Units: %; Status:     

      F                                                                                           

      Test: BASO %; Value: 1.1; Range: 0.0-1.0; Abnormal: Above high normal; Units: %;            

      Status: F                                                                                   

      Test: LARGE UNSTAINED CELL %; Value: 3.5; Range: 0.0-4.0; Units: %; Status: F               

      Test: NEUTROPHILS #; Value: 2.9; Range: 1.8-7.7; Units: K/mm3; Status: F                    

      Test: LYMPH #; Value: 2.2; Range: 1.5-4.5; Units: K/mm3; Status: F                          

      Test: MONO #; Value: 0.4; Range: 0.0-0.8; Units: K/mm3; Status: F                           

      Test: EOS #; Value: 0.2; Range: 0.0-0.50; Units: K/mm3; Status: F                           

      Test: BASO #; Value: 0.1; Range: 0.0-0.2; Units: K/mm3; Status: F                           

      Test: LARGE UNSTAINED CELL #; Value: 0.2; Range: 0.0-0.4; Units: K/mm3; Status: F           

Lab Order: Troponin; SPEC'M 17 15:57                                                        

      Test: TROPONIN I; Value: < 0.02; Range: < 0.10; Units: NG/ML; Status: F                     

      Test Note: &nbsp;; Troponin I Reference Interval for [employer] [place] LOCI: 99th             

      Percentile= 0.00-0.045 ng/ml Risk Stratification: <= 0.10 ng/ml Decreased Risk for          

      Adverse Clinical Events. 0.10-1.50 ng/ml Increased Risk for Adverse Clinical Events.        

      Evaluation of additional criterion and/or repeat testing in 2-6 hours is suggested to       

      rule out myocardial damage. >= 1.50 ng/ml Indicative of Myocardial Injury.                  

Lab Order: BMP; SPEC'M 17 15:57                                                             

      Test: GLUCOSE, FASTING; Value: 87; Range: ; Units: MG/DL; Status: F                   

      Test: BLOOD UREA NITROGEN; Value: 18; Range: 7-18; Units: MG/DL; Status: F                  

      Test: CREATININE FOR GFR; Value: 0.98; Range: 0.70-1.30; Units: MG/DL; Status: F            

      Test: GLOMERULAR FILTRATION RATE; Value: > 60.0; Range: >60; Status: F                      

      Test: SODIUM LEVEL; Value: 143; Range: 136-145; Units: MEQ/L; Status: F                     

      Test: POTASSIUM SERUM; Value: 4.2; Range: 3.5-5.1; Units: MEQ/L; Status: F                  

      Test: CHLORIDE LEVEL; Value: 107; Range: ; Units: MEQ/L; Status: F                    

      Test: CARBON DIOXIDE LEVEL; Value: 32; Range: 21-32; Units: MEQ/L; Status: F                

      Test: ANION GAP; Value: 4; Range: 8-16; Abnormal: Below low normal; Units: MEQ/L;           

      Status: F                                                                                   

      Test: CALCIUM LEVEL; Value: 8.0; Range: 8.5-10.1; Abnormal: Below low normal; Units:        

      MG/DL; Status: F                                                                            

      Test Note: &nbsp;; Units are mL/min/1.73 m2 Chronic Kidney Disease Staging per NKF:       

      Stage I & II GFR >=60 Normal to Mildly Decreased Stage III GFR 30-59 Moderately           

      Decreased Stage IV GFR 15-29 Severely Decreased Stage V GFR <15 Very Little GFR Left        

      ESRD GFR <15 on RRT                                                                         

Lab Order: Liver Profile; SPEC'M 17 15:57                                                   

      Test: AST/SGOT; Value: 11; Range: 15-37; Abnormal: Below low normal; Units: U/L;            

      Status: F                                                                                   

      Test: ALT/SGPT; Value: 12; Range: 12-78; Units: U/L; Status: F                              

      Test: ALKALINE PHOSPHATASE; Value: 82; Range: ; Units: U/L; Status: F                 

      Test: BILIRUBIN,TOTAL; Value: 0.5; Range: 0.2-1.0; Units: MG/DL; Status: F                  

      Test: BILIRUBIN,DIRECT; Value: 0.1; Range: 0.0-0.2; Units: MG/DL; Status: F                 

      Test: TOTAL PROTEIN; Value: 6.4; Range: 6.4-8.2; Units: GM/DL; Status: F                    

      Test: ALBUMIN; Value: 3.2; Range: 3.2-5.2; Units: GM/DL; Status: F                          

      Test: ALBUMIN/GLOBULIN RATIO; Value: 1.00; Range: 1.00-1.93; Status: F                      

Lab Order: CK-MB; SPEC'M 17 15:57                                                           

      Test: CPK CREATINE PHOSPHOKINASE; Value: 56; Range: ; Units: U/L; Status: F           

      Test: CK-MB VALUE MASS; Value: 1.0; Range: 0.0-3.6; Units: NG/ML; Status: F                 

      Test: MB/CK RELATIVE INDEX; Value: 1.78; Range: < OR =4; Status: F                          

      Test Note: &nbsp;; DIAGNOSIS CRITERIA MMB ng/ml Relative Index (RI) NON-AMI < or = 5      

      N/A GRAY ZONE > 5 < or = 4 AMI > 5 > 4                                                      

                                                                                                  

Radiology Order: CT Head Without Contrast                                                         

      Test: CT Head Without Contrast                                                              

      REASON FOR EXAMINATION: Syncope; Clinical: Syncope.; ; Comparison: 2016.; ;           

      Findings:; The ventricles, sulci, and cisterns are normal in position and appearance.;      

      Gray-white differentiation is maintained. No acute intracranial hemorrhage,; mass/mass      

      effect, pathology or trauma/injury. No evidence for acute infarction.; No extra-axial       

      fluid collection. Calvarium is intact. Paranasal sinuses and; mastoid air cells are         

      clear.; ; Impression:; Normal noncontrast head CT.; No evidence for acute intracranial      

      pathology or trauma/injury.; ; ; Signed by; Sami Hartman MD 2017 03:25 P;           

Outcome:                                                                                          

17:26 Discharge ordered by Provider.                                                          [id number] 

17:45 Discharge Assessment: Patient awake, alert and oriented x 3. No cognitive and/or        hs1 

      functional deficits noted. Patient verbalized understanding of disposition                  

      instructions. patient administered narcotics - no. The following High Risk Discharge        

      criteria are identified: None. Discharged to home ambulatory, with family. Condition:       

      stable. Discharge instructions given to patient. No special radiology studies were          

      completed. Property sent home with patient.                                                 

18:33 Patient left the ED.                                                                    hs1 

                                                                                                  

Signatures:                                                                                       

Dispatcher [employer]                           EDMS                                                 

Munir Love, RN                      RN   Yun Newman RN                        RN   Navneet Morales, RN                       Richard Mary RN                   RN   mlb1                                                 

Nash Martin Daniell, PCA                 PCA  dd6                                                  

Lena Stapleton RN                    RN   hs1                                                  

Laura Wiggins RN                        RN   ms18                                                 

Hafsa Chapman, PCA                   PCA  rs6                                                  

Feliz Flanagan PA-C PA-C [id number]                                                  

                                                                                                  

**************************************************************************************************

[facility]

## 2017-01-15 NOTE — ECGEPIP
Stationary ECG Study

                           [facility] - ED

                                       

                                       Test Date:    2017

Pat Name:     TANIKA GUSMAN              Department:   

Patient ID:   [id number]                 Room:         -

Gender:       M                        Technician:   donovan

:          1971               Requested By: Lilian Wheeler 

Order Number: [id number]     Reading MD:   Lilian Wheeler

                                 Measurements

Intervals                              Axis          

Rate:         57                       P:            34

IL:           162                      QRS:          -10

QRSD:         106                      T:            17

QT:           432                                    

QTc:          421                                    

                           Interpretive Statements

SINUS BRADYCARDIA

 

Electronically Signed On 1- 7:27:41 EST by Lilian Wheeler

## 2017-01-16 NOTE — EDDOCDS
Physician Documentation                                                                           

[facility]                                                                         

Name: Sami Claros                                                                                 

Age: 45 yrs                                                                                       

Sex: Male                                                                                         

: 1971                                                                                   

MRN: [id number]                                                                                     

Arrival Date: 2017                                                                          

Time: 13:26                                                                                       

Account#: [id number]                                                                              

Bed TR7                                                                                           

Private MD: [facility]                                                                  

Disposition:                                                                                      

17 17:26 Discharged to Home/Self Care. Impression: Dizziness and giddiness.                 

- Condition is Stable.                                                                            

                                                                                                  

                                                                                                  

- Medication Reconciliation, Local Pharmacy Hours form.                                           

- Follow up: Emergency Department; When: As soon as possible; Reason: Worsening of                

conditions. Follow up: Private Physician; When: 2 - 3 days; Reason: Recheck today's             

complaints.                                                                                     

- Problem is new.                                                                                 

- Symptoms are unchanged.                                                                         

                                                                                                  

                                                                                                  

                                                                                                  

Historical:                                                                                       

- Allergies: no known allergies;                                                                  

- Home Meds:                                                                                      

1. albuterol sulfate 90 mcg/actuation Inhl HFAA 1 puff every 4 hours as needed                  

2. Albuterol-Ipratropium Inhl 3 mL as needed                                                    

3. Calcium with D 2 tab nightly                                                                 

4. Flonase 50 mcg/actuation Nasal spsn 1 spray 2 times per day                                  

5. Qvar inhalation 1 puff 2 times per day                                                       

6. Singulair 10 mg Oral tab 1 tab once daily                                                    

7. Symbicort INH 2 puff daily                                                                   

8. Zyrtec 10 mg Oral tab 1 tab once daily                                                       

- PMHx: Asthma; Seasonal Allergies; Sleep Apnea w/ CPAP;                                          

- PSHx: Tonsillectomy; Gastric Bypass; Vasectomy; Left Shoulder Surgery; Left Ankle               

Surgery;                                                                                        

- Social history: Smoking status: Patient states former smoker of tobacco. No barriers            

to communication noted, The patient speaks fluent English, Speaks appropriately for             

age.                                                                                            

- Family history: Not pertinent.                                                                  

- : The pt / caregiver states he / she is not on anticoagulants. Home medication list             

is obtained from the patient.                                                                   

- Exposure Risk Screening:: None identified.                                                      

                                                                                                  

                                                                                                  

Vital Signs:                                                                                      

                                                                                             

13:27  / 62; Pulse 52; Resp 18 S; Temp 97.1(O); Pulse Ox 100% on R/A; Weight 136.08 kg  dd6 

      / 300.01 lbs (R); Height 6 ft. 0 in. (182.88 cm) (R);                                       

15:56  / 58 (auto/);                                                                    hs1 

15:58 Pulse 48 MON;                                                                           hs1 

16:10 Pulse 50 MON;                                                                           hs1 

16:11  / 62 (auto/);                                                                    hs1 

16:40 Pulse 48 MON;                                                                           hs1 

16:41  / 66 (auto/);                                                                    hs1 

17:10 Pulse 48 MON;                                                                           hs1 

17:11  / 66 (auto/);                                                                    hs1 

17:24  / 83 Supine; Pulse 53;                                                           ms18

17:24  / 83 Standing; Pulse 53;                                                         ms18

17:25 Pulse 50 MON;                                                                           hs1 

17:26  / 67 (auto/);                                                                    hs1 

17:26 Pulse 52 MON; Resp 18; Temp 97.2; Pulse Ox 98% ; Pain 0/10;                             hs1 

17:26  / 67 (auto/);                                                                    hs1 

13:27 Body Mass Index 40.69 (136.08 kg, 182.88 cm)                                            dd6 

                                                                                                  

MDM:                                                                                              

13:46 ECG WITH READING ER PHYS+CARDIAG ordered.                                               EDMS

15:11 CBC with Diff Ordered.                                                                  EDMS

15:11 Troponin Ordered.                                                                       EDMS

15:11 BMP Ordered.                                                                            EDMS

15:11 Liver Profile Ordered.                                                                  EDMS

15:11 CK-MB Ordered.                                                                          EDMS

15:12 CT Head Without Contrast Ordered.                                                       EDMS

15:15 Cardiac Monitor ordered.                                                                jk8 

15:18 IV Saline Lock ordered.                                                                 jk8 

16:23 Financial registration complete.                                                        zo  

16:24 [facility] Payment Agreement was scanned into [employer] and attached to record.               zo  

16:54 CBC with Diff Reviewed.                                                                 jk8 

16:54 BMP Reviewed.                                                                           jk8 

16:54 Liver Profile Reviewed.                                                                 jk8 

16:54 Troponin Reviewed.                                                                      jk8 

16:54 CK-MB Reviewed.                                                                         jk8 

16:54 CT Head Without Contrast Reviewed.                                                      jk8 

01/15                                                                                             

08:45 T-Sheet-- Draft Copy was scanned into [employer] and attached to record.                   [facility] 

08:52 ECG/EKG was scanned into [employer] and attached to record.                                [facility] 

                                                                                                  

Signatures:                                                                                       

Dispatcher [employer]                           EDRichard Mike RN                   RN   [id number]                                                 

Nash Martin Hannah, RN                    RN   hs1                                                  

Feliz Flanagan PA-C PA-C jk8 Hoffert, Sarah seh                                                  

                                                                                                  

The chart was reviewed and I authenticate all verbal orders and agree with the evaluation and 
treatment provided.Attachments:

01/14                                                                                             

16:24 [facility] Payment Agreement                                                                zo  

01/15                                                                                             

08:45 T-Sheet-- Draft Copy                                                                    [facility] 

08:52 ECG/EKG                                                                                 [facility] 

                                                                                                  

**************************************************************************************************



*** Chart Complete ***
MTDD

## 2017-01-16 NOTE — EDDOCDS
Nurse's Notes                                                                                     

[facility]                                                                         

Name: Tanika Gusman                                                                                 

Age: 45 yrs                                                                                       

Sex: Male                                                                                         

: 1971                                                                                   

MRN: [id number]                                                                                     

Arrival Date: 2017                                                                          

Time: 13:26                                                                                       

Account#: [id number]                                                                              

Bed TR7                                                                                           

Private MD: [facility], [facility]                                                                  

Diagnosis: Dizziness and giddiness                                                                

                                                                                                  

Presentation:                                                                                     

                                                                                             

13:41 Presenting complaint: Patient states: Dizziness and nausea began yesterday. Adult       mlb1

      Sepsis Screening: The patient does not have new or worsening altered mentation.             

      Patient's respiratory rate is less than 22. Systolic blood pressure is greater than         

      100. Patient has a qSOFA score of 0- Negative Sepsis Screen. Suicide/Homicide risk          

      assessment- the patient denies having any suicidal and/or homicidal ideations and does      

      not present with any other emotional, behavioral or mental health complaints.       

      Status: Patient is not a  or  dependent. Transition of care:          

      patient was not received from another setting of care.                                      

13:41 Acuity: JIM Level 3                                                                     mlb1

13:41 Method Of Arrival: Walkin/Carried/Asstd                                                 mlb1

                                                                                                  

Triage Assessment:                                                                                

13:43 General: Appears in no apparent distress, comfortable, Behavior is appropriate for age, mlb1

      cooperative. Pain: Denies pain. HIV screening NA for this visit Offered previously.         

      Neurological: Reports dizziness. GI: Reports nausea. Derm: Skin is pink, warm & dry.      

      normal.                                                                                     

                                                                                                  

Historical:                                                                                       

- Allergies: no known allergies;                                                                  

- Home Meds:                                                                                      

1. albuterol sulfate 90 mcg/actuation Inhl HFAA 1 puff every 4 hours as needed                  

2. Albuterol-Ipratropium Inhl 3 mL as needed                                                    

3. Calcium with D 2 tab nightly                                                                 

4. Flonase 50 mcg/actuation Nasal spsn 1 spray 2 times per day                                  

5. Qvar inhalation 1 puff 2 times per day                                                       

6. Singulair 10 mg Oral tab 1 tab once daily                                                    

7. Symbicort INH 2 puff daily                                                                   

8. Zyrtec 10 mg Oral tab 1 tab once daily                                                       

- PMHx: Asthma; Seasonal Allergies; Sleep Apnea w/ CPAP;                                          

- PSHx: Tonsillectomy; Gastric Bypass; Vasectomy; Left Shoulder Surgery; Left Ankle               

Surgery;                                                                                        

- Social history: Smoking status: Patient states former smoker of tobacco. No barriers            

to communication noted, The patient speaks fluent English, Speaks appropriately for             

age.                                                                                            

- Family history: Not pertinent.                                                                  

- : The pt / caregiver states he / she is not on anticoagulants. Home medication list             

is obtained from the patient.                                                                   

- Exposure Risk Screening:: None identified.                                                      

                                                                                                  

                                                                                                  

Screenin:22 Screening information is obtained from the patient. Fall risk: No risks identified.     hs1 

      Assistance ADL's: requires no assistance with activities of daily living. Abuse/DV          

      Screen: The patient / caregiver reports he/she is: not in a situation that causes fear,     

      pain or injury. Nutritional screening: No deficits noted. Advance Directives: There is      

      no active DNR order. home support is adequate.                                              

                                                                                                  

Assessment:                                                                                       

13:50 General: Appears in no apparent distress, Behavior is appropriate for age, cooperative. hs1 

      Neurological: No deficits noted. Cardiovascular: Rhythm is sinus bradycardia No ectopy.     

      Cardiovascular: Chest pain is denied. Respiratory: Airway is patent Respiratory effort      

      is even, unlabored, Respiratory pattern is regular, symmetrical. GI: Abdomen is flat,       

      non- distended obese. Derm: Skin is pink, warm & dry. normal.                             

                                                                                                  

Vital Signs:                                                                                      

13:27  / 62; Pulse 52; Resp 18 S; Temp 97.1(O); Pulse Ox 100% on R/A; Weight 136.08 kg  dd6 

      (R); Height 6 ft. 0 in. (182.88 cm) (R);                                                    

15:56  / 58 (auto/);                                                                    hs1 

15:58 Pulse 48 MON;                                                                           hs1 

16:10 Pulse 50 MON;                                                                           hs1 

16:11  / 62 (auto/);                                                                    hs1 

16:40 Pulse 48 MON;                                                                           hs1 

16:41  / 66 (auto/);                                                                    hs1 

17:10 Pulse 48 MON;                                                                           hs1 

17:11  / 66 (auto/);                                                                    hs1 

17:24  / 83 Supine; Pulse 53;                                                           ms18

17:24  / 83 Standing; Pulse 53;                                                         ms18

17:25 Pulse 50 MON;                                                                           hs1 

17:26  / 67 (auto/);                                                                    hs1 

17:26 Pulse 52 MON; Resp 18; Temp 97.2; Pulse Ox 98% ; Pain 0/10;                             hs1 

17:26  / 67 (auto/);                                                                    hs1 

13:27 Body Mass Index 40.69 (136.08 kg, 182.88 cm)                                            dd6 

                                                                                                  

Vitals:                                                                                           

13:27 Log In Time: 2017 at 13:25.                                                 dd6 

                                                                                                  

ED Course:                                                                                        

13:27 Patient visited by Jose Alberto Gillespie PCA.                                             dd6 

13:27 [facility], [facility] is Private Physician.                                              dd6 

13:27 Patient moved to Waiting                                                                dd6 

13:29 Patient moved to Pre RCE                                                                dd6 

13:41 Patient visited by Richard Valencia RN.                                               mlb1

13:42 Triage Initiated                                                                        mlb1

13:45 Patient moved to PR2 / 26                                                               mlb1

13:54 Patient moved to Pre RCE                                                                bcj 

14:46 Patient moved to Triage 2                                                               mlb1

14:49 Feliz Flanagan PA-C is PHCP.                                                          jk8 

14:49 Lilian Wheeler MD is Attending Physician.                                      jk8 

14:49 Patient visited by Feliz Flanagan PA-C.                                               jk8 

14:57 Patient moved to I5 / M5                                                                rs6 

15:00 The patient / caregiver is instructed regarding the plan of care and ED course.         hs1 

15:17 Patient moved to 13                                                                     dls 

15:54 CT Head Without Contrast Returned.                                                      EDMS

16:14 Patient visited by Lena Stapleton RN.                                                hs1 

16:23 [facility] Payment Agreement was scanned into [employer] and attached to record.               zo  

17:45 Discontinued IV lock intact, bleeding controlled, pressure dressing applied, No         hs1 

      redness/swelling at site. No procedures done that require assistance.                       

17:54 Patient moved to TR2                                                                    dy  

18:06 Patient moved to TR7                                                                    ms18

01/15                                                                                             

07:58 EKG-ADULT Returned.                                                                     EDMS

08:45 T-Sheet-- Draft Copy was scanned into [employer] and attached to record.                   seh 

08:52 ECG/EKG was scanned into [employer] and attached to record.                                [facility] 

                                                                                                  

Order Results:                                                                                    

Lab Order: CBC with Diff; SPEC'M 17 15:57                                                   

      Test: WHITE BLOOD COUNT; Value: 5.8; Range: 4.0-10.0; Units: K/mm3; Status: F               

      Test: RED BLOOD COUNT; Value: 4.61; Range: 4.30-6.10; Units: M/mm3; Status: F               

      Test: HEMOGLOBIN; Value: 14.0; Range: 14.0-18.0; Units: g/dl; Status: F                     

      Test: HEMATOCRIT; Value: 43.3; Range: 42.0-52.0; Units: %; Status: F                        

      Test: MEAN CORPUSCULAR VOLUME; Value: 93.8; Range: 80.0-96.0; Units: fl; Status: F          

      Test: MEAN CORPUSCULAR HEMOGLOBIN; Value: 30.2; Range: 27.0-33.0; Units: pg; Status: F      

      Test: MEAN CORPUSCULAR HGB CONC; Value: 32.3; Range: 32.0-36.5; Units: g/dl; Status: F      

      Test: RED CELL DISTRIBUTION WIDTH; Value: 13.3; Range: 11.5-14.5; Units: %; Status: F       

      Test: PLATELET COUNT, AUTOMATED; Value: 245; Range: 150-450; Units: k/mm3; Status: F        

      Test: NEUTROPHILS %; Value: 50.7; Range: 36.0-66.0; Units: %; Status: F                     

      Test: LYMPH %; Value: 34.8; Range: 24.0-44.0; Units: %; Status: F                           

      Test: MONO %; Value: 6.7; Range: 0.0-5.0; Abnormal: Above high normal; Units: %;            

      Status: F                                                                                   

      Test: EOS %; Value: 3.3; Range: 0.0-3.0; Abnormal: Above high normal; Units: %; Status:     

      F                                                                                           

      Test: BASO %; Value: 1.1; Range: 0.0-1.0; Abnormal: Above high normal; Units: %;            

      Status: F                                                                                   

      Test: LARGE UNSTAINED CELL %; Value: 3.5; Range: 0.0-4.0; Units: %; Status: F               

      Test: NEUTROPHILS #; Value: 2.9; Range: 1.8-7.7; Units: K/mm3; Status: F                    

      Test: LYMPH #; Value: 2.2; Range: 1.5-4.5; Units: K/mm3; Status: F                          

      Test: MONO #; Value: 0.4; Range: 0.0-0.8; Units: K/mm3; Status: F                           

      Test: EOS #; Value: 0.2; Range: 0.0-0.50; Units: K/mm3; Status: F                           

      Test: BASO #; Value: 0.1; Range: 0.0-0.2; Units: K/mm3; Status: F                           

      Test: LARGE UNSTAINED CELL #; Value: 0.2; Range: 0.0-0.4; Units: K/mm3; Status: F           

Lab Order: Troponin; SPEC'M 17 15:57                                                        

      Test: TROPONIN I; Value: < 0.02; Range: < 0.10; Units: NG/ML; Status: F                     

      Test Note: &nbsp;; Troponin I Reference Interval for Siemens [place] LOCI: 99th             

      Percentile= 0.00-0.045 ng/ml Risk Stratification: <= 0.10 ng/ml Decreased Risk for          

      Adverse Clinical Events. 0.10-1.50 ng/ml Increased Risk for Adverse Clinical Events.        

      Evaluation of additional criterion and/or repeat testing in 2-6 hours is suggested to       

      rule out myocardial damage. >= 1.50 ng/ml Indicative of Myocardial Injury.                  

Lab Order: BMP; SPEC'M 17 15:57                                                             

      Test: GLUCOSE, FASTING; Value: 87; Range: ; Units: MG/DL; Status: F                   

      Test: BLOOD UREA NITROGEN; Value: 18; Range: 7-18; Units: MG/DL; Status: F                  

      Test: CREATININE FOR GFR; Value: 0.98; Range: 0.70-1.30; Units: MG/DL; Status: F            

      Test: GLOMERULAR FILTRATION RATE; Value: > 60.0; Range: >60; Status: F                      

      Test: SODIUM LEVEL; Value: 143; Range: 136-145; Units: MEQ/L; Status: F                     

      Test: POTASSIUM SERUM; Value: 4.2; Range: 3.5-5.1; Units: MEQ/L; Status: F                  

      Test: CHLORIDE LEVEL; Value: 107; Range: ; Units: MEQ/L; Status: F                    

      Test: CARBON DIOXIDE LEVEL; Value: 32; Range: 21-32; Units: MEQ/L; Status: F                

      Test: ANION GAP; Value: 4; Range: 8-16; Abnormal: Below low normal; Units: MEQ/L;           

      Status: F                                                                                   

      Test: CALCIUM LEVEL; Value: 8.0; Range: 8.5-10.1; Abnormal: Below low normal; Units:        

      MG/DL; Status: F                                                                            

      Test Note: &nbsp;; Units are mL/min/1.73 m2 Chronic Kidney Disease Staging per NKF:       

      Stage I & II GFR >=60 Normal to Mildly Decreased Stage III GFR 30-59 Moderately           

      Decreased Stage IV GFR 15-29 Severely Decreased Stage V GFR <15 Very Little GFR Left        

      ESRD GFR <15 on RRT                                                                         

Lab Order: Liver Profile; SPEC'M 17 15:57                                                   

      Test: AST/SGOT; Value: 11; Range: 15-37; Abnormal: Below low normal; Units: U/L;            

      Status: F                                                                                   

      Test: ALT/SGPT; Value: 12; Range: 12-78; Units: U/L; Status: F                              

      Test: ALKALINE PHOSPHATASE; Value: 82; Range: ; Units: U/L; Status: F                 

      Test: BILIRUBIN,TOTAL; Value: 0.5; Range: 0.2-1.0; Units: MG/DL; Status: F                  

      Test: BILIRUBIN,DIRECT; Value: 0.1; Range: 0.0-0.2; Units: MG/DL; Status: F                 

      Test: TOTAL PROTEIN; Value: 6.4; Range: 6.4-8.2; Units: GM/DL; Status: F                    

      Test: ALBUMIN; Value: 3.2; Range: 3.2-5.2; Units: GM/DL; Status: F                          

      Test: ALBUMIN/GLOBULIN RATIO; Value: 1.00; Range: 1.00-1.93; Status: F                      

Lab Order: CK-MB; SPEC'M 17 15:57                                                           

      Test: CPK CREATINE PHOSPHOKINASE; Value: 56; Range: ; Units: U/L; Status: F           

      Test: CK-MB VALUE MASS; Value: 1.0; Range: 0.0-3.6; Units: NG/ML; Status: F                 

      Test: MB/CK RELATIVE INDEX; Value: 1.78; Range: < OR =4; Status: F                          

      Test Note: &nbsp;; DIAGNOSIS CRITERIA MMB ng/ml Relative Index (RI) NON-AMI < or = 5      

      N/A GRAY ZONE > 5 < or = 4 AMI > 5 > 4                                                      

                                                                                                  

Radiology Order: EKG-ADULT                                                                        

      Test: EKG-ADULT                                                                             

      REASON FOR EXAMINATION: dizziness; Stationary ECG Study; [facility] - ED; ;         

      Test Date: 2017; Pat Name: TANIKA GUSMAN Department:; Patient ID: [id number] Room: -;     

      Gender: M Technician: donovan; : 1971 Requested By: Lilian Wheeler; Order        

      Number: [id number] Reading MD: Lilian Wheeler; Measurements; Intervals     

      Axis; Rate: 57 P: 34; NV: 162 QRS: -10; QRSD: 106 T: 17; QT: 432; QTc: 421;                 

      Interpretive Statements; SINUS BRADYCARDIA; ; Electronically Signed On 1-            

      7:27:41 EST by Lilian Wheeler;                                                       

Radiology Order: CT Head Without Contrast                                                         

      Test: CT Head Without Contrast                                                              

      REASON FOR EXAMINATION: Syncope; Clinical: Syncope.; ; Comparison: 2016.; ;           

      Findings:; The ventricles, sulci, and cisterns are normal in position and appearance.;      

      Gray-white differentiation is maintained. No acute intracranial hemorrhage,; mass/mass      

      effect, pathology or trauma/injury. No evidence for acute infarction.; No extra-axial       

      fluid collection. Calvarium is intact. Paranasal sinuses and; mastoid air cells are         

      clear.; ; Impression:; Normal noncontrast head CT.; No evidence for acute intracranial      

      pathology or trauma/injury.; ; ; Signed by; Tanika Hartman MD 2017 03:25 P;           

Outcome:                                                                                          

                                                                                             

17:26 Discharge ordered by Provider.                                                          [id number] 

17:45 Discharge Assessment: Patient awake, alert and oriented x 3. No cognitive and/or        hs1 

      functional deficits noted. Patient verbalized understanding of disposition                  

      instructions. patient administered narcotics - no. The following High Risk Discharge        

      criteria are identified: None. Discharged to home ambulatory, with family. Condition:       

      stable. Discharge instructions given to patient. No special radiology studies were          

      completed. Property sent home with patient.                                                 

18:33 Patient left the ED.                                                                    hs1 

                                                                                                  

Signatures:                                                                                       

Dispatcher [employer]                           EDMS                                                 

Munir Love RN RN bcj Scott, Debra, RN RN dls Youngs, David, Richard Mary RN, RN                   RN   mlb1                                                 

Nash Martin Daniell, PCA                 PCA  dd6                                                  

Lena Stapleton RN RN   hs1                                                  

Laura Wiggins RN RN   ms18                                                 

Hafsa Chapman, PCA                   PCA  rs6                                                  

Feliz Flanagan PA-C PA-C jk8 Hoffert, Sarah seh                                                  

                                                                                                  

**************************************************************************************************



*** Chart Complete ***
MTDD

## 2017-01-16 NOTE — EDDOCDS
Physician Documentation                                                                           

[facility]                                                                         

Name: Sami Claros                                                                                 

Age: 45 yrs                                                                                       

Sex: Male                                                                                         

: 1971                                                                                   

MRN: [id number]                                                                                     

Arrival Date: 2017                                                                          

Time: 13:26                                                                                       

Account#: [id number]                                                                              

Bed TR7                                                                                           

Private MD: [facility]                                                                  

Disposition:                                                                                      

17 17:26 Discharged to Home/Self Care. Impression: Dizziness and giddiness.                 

- Condition is Stable.                                                                            

                                                                                                  

                                                                                                  

- Medication Reconciliation, Local Pharmacy Hours form.                                           

- Follow up: Emergency Department; When: As soon as possible; Reason: Worsening of                

conditions. Follow up: Private Physician; When: 2 - 3 days; Reason: Recheck today's             

complaints.                                                                                     

- Problem is new.                                                                                 

- Symptoms are unchanged.                                                                         

                                                                                                  

                                                                                                  

                                                                                                  

Historical:                                                                                       

- Allergies: no known allergies;                                                                  

- Home Meds:                                                                                      

1. albuterol sulfate 90 mcg/actuation Inhl HFAA 1 puff every 4 hours as needed                  

2. Albuterol-Ipratropium Inhl 3 mL as needed                                                    

3. Calcium with D 2 tab nightly                                                                 

4. Flonase 50 mcg/actuation Nasal spsn 1 spray 2 times per day                                  

5. Qvar inhalation 1 puff 2 times per day                                                       

6. Singulair 10 mg Oral tab 1 tab once daily                                                    

7. Symbicort INH 2 puff daily                                                                   

8. Zyrtec 10 mg Oral tab 1 tab once daily                                                       

- PMHx: Asthma; Seasonal Allergies; Sleep Apnea w/ CPAP;                                          

- PSHx: Tonsillectomy; Gastric Bypass; Vasectomy; Left Shoulder Surgery; Left Ankle               

Surgery;                                                                                        

- Social history: Smoking status: Patient states former smoker of tobacco. No barriers            

to communication noted, The patient speaks fluent English, Speaks appropriately for             

age.                                                                                            

- Family history: Not pertinent.                                                                  

- : The pt / caregiver states he / she is not on anticoagulants. Home medication list             

is obtained from the patient.                                                                   

- Exposure Risk Screening:: None identified.                                                      

                                                                                                  

                                                                                                  

Vital Signs:                                                                                      

                                                                                             

13:27  / 62; Pulse 52; Resp 18 S; Temp 97.1(O); Pulse Ox 100% on R/A; Weight 136.08 kg  dd6 

      / 300.01 lbs (R); Height 6 ft. 0 in. (182.88 cm) (R);                                       

15:56  / 58 (auto/);                                                                    hs1 

15:58 Pulse 48 MON;                                                                           hs1 

16:10 Pulse 50 MON;                                                                           hs1 

16:11  / 62 (auto/);                                                                    hs1 

16:40 Pulse 48 MON;                                                                           hs1 

16:41  / 66 (auto/);                                                                    hs1 

17:10 Pulse 48 MON;                                                                           hs1 

17:11  / 66 (auto/);                                                                    hs1 

17:24  / 83 Supine; Pulse 53;                                                           ms18

17:24  / 83 Standing; Pulse 53;                                                         ms18

17:25 Pulse 50 MON;                                                                           hs1 

17:26  / 67 (auto/);                                                                    hs1 

17:26 Pulse 52 MON; Resp 18; Temp 97.2; Pulse Ox 98% ; Pain 0/10;                             hs1 

17:26  / 67 (auto/);                                                                    hs1 

13:27 Body Mass Index 40.69 (136.08 kg, 182.88 cm)                                            dd6 

                                                                                                  

MDM:                                                                                              

13:46 ECG WITH READING ER PHYS+CARDIAG ordered.                                               EDMS

15:11 CBC with Diff Ordered.                                                                  EDMS

15:11 Troponin Ordered.                                                                       EDMS

15:11 BMP Ordered.                                                                            EDMS

15:11 Liver Profile Ordered.                                                                  EDMS

15:11 CK-MB Ordered.                                                                          EDMS

15:12 CT Head Without Contrast Ordered.                                                       EDMS

15:15 Cardiac Monitor ordered.                                                                jk8 

15:18 IV Saline Lock ordered.                                                                 jk8 

16:23 Financial registration complete.                                                        zo  

16:24 [facility] Payment Agreement was scanned into [employer] and attached to record.               zo  

16:54 CBC with Diff Reviewed.                                                                 jk8 

16:54 BMP Reviewed.                                                                           jk8 

16:54 Liver Profile Reviewed.                                                                 jk8 

16:54 Troponin Reviewed.                                                                      jk8 

16:54 CK-MB Reviewed.                                                                         jk8 

16:54 CT Head Without Contrast Reviewed.                                                      jk8 

01/15                                                                                             

08:45 T-Sheet-- Draft Copy was scanned into [employer] and attached to record.                   [facility] 

08:52 ECG/EKG was scanned into [employer] and attached to record.                                [facility] 

                                                                                                  

Signatures:                                                                                       

Dispatcher [employer]                           EDRichard Mike RN                   RN   mlb1                                                 

Nash Martin Hannah, RN                    RN   hs1                                                  

Feliz Flanagan PA-C PA-C jk8 Hoffert, Sarah seh                                                  

                                                                                                  

The chart was reviewed and I authenticate all verbal orders and agree with the evaluation and 
treatment provided.Attachments:

01/14                                                                                             

16:24 [facility] Payment Agreement                                                                zo  

01/15                                                                                             

08:45 T-Sheet-- Draft Copy                                                                    [facility] 

08:52 ECG/EKG                                                                                 [facility] 

                                                                                                  

**************************************************************************************************



*** Chart Complete ***
MTDD

## 2017-03-02 NOTE — REP
CHEST, TWO VIEWS:

 

HISTORY: Dyspnea and cough.

 

COMPARISON: 01/04/2017

 

FINDINGS: The superior mediastinal structures are midline. The cardiac silhouette

is unremarkable in size, shape and position. The diaphragmatic surfaces of the

lungs are regular and the costophrenic angles are clear. The pulmonary fields are

clear. The imaged osseous structures are intact.

 

IMPRESSION:

 

There is no acute cardiopulmonary disease. No significant change from the prior

examination.

 

 

Signed by

Jerson Andrade DO 03/02/2017 07:49 P

## 2017-03-11 NOTE — REP
REASON:  Cough

 

COMPARISON: 03/02/2017

 

FINDINGS:  The superior mediastinal structures are midline.  The cardiac

silhouette is unremarkable in size, shape, and position.  The diaphragmatic

surfaces of the lungs are regular, and the costophrenic angles are clear.  The

pulmonary fields are clear.  The imaged osseous structures are intact.

 

IMPRESSION:

 

There is no acute cardiopulmonary disease.

 

 

Signed by

Jerson Andrade DO 03/11/2017 10:04 A

## 2017-03-12 NOTE — REPUSA
History: shortness of breath

Comparison: No prior CTA of the chest available

Technique: A CT-pulmonary angiogram was performed. A dose of intravenous contrast was administered. A
xial images were displayed, as were sagittal and coronal reconstructions. A 3-D model was also render
ed. Exam DLP:

Findings: Somewhat limited evaluation due to suboptimal bolus. No CT evidence of central pulmonary em
bolism is identified.

There is no evidence of thoracic aortic aneurysm or dissection.

No air space consolidation is identified in the lungs. There is no evidence of pulmonary edema. No pa
thologically enlarged hilar or mediastinal lymph nodes are identified. No significant pleural or major
cardial fluid collection is seen. There is no evidence of pneumothorax.

Mild degenerative changes are noted in the spine. The included portion of the upper abdomen does not 
show significant abnormality.

Impression:

Suboptimal bolus.

No evidence of central pulmonary embolism is identified. 

 

     Electronically signed by WAYNE TURNER MD on 03/12/2017 07:08:51 PM ET

## 2017-03-13 NOTE — ECGEPIP
Stationary ECG Study

                           [facility] - ED

                                       

                                       Test Date:    2017

Pat Name:     TANIKA GUSMAN              Department:   

Patient ID:   [id number]                 Room:         -

Gender:       M                        Technician:   mikki

:          1971               Requested By: Maja Lundborg-Gray 

Order Number: [id number]     Reading MD:   Lilian Wheeler

                                 Measurements

Intervals                              Axis          

Rate:         73                       P:            37

MT:           152                      QRS:          -18

QRSD:         105                      T:            20

QT:           386                                    

QTc:          427                                    

                           Interpretive Statements

SINUS RHYTHM

INCREASED RATE 17

Electronically Signed On 3- 12:27:23 EDT by Lilian Wheeler

## 2017-03-13 NOTE — REP
Clinical:  Cough.  Dyspnea .

 

Comparison:  03/10/2017 .

 

Findings:

The mediastinum and cardiac silhouette are stable and within normal limits for

portable technique.  The lung fields are clear without acute consolidation,

effusion, or pneumothorax.  Skeletal structures are intact.

 

Impression:

Normal portable chest x-ray

 

 

Signed by

Sami Hartman MD 03/13/2017 07:27 A

## 2017-06-16 NOTE — ECGEPIP
Stationary ECG Study

                           [facility] - ED

                                       

                                       Test Date:    2017

Pat Name:     TANIKA GUSMAN              Department:   

Patient ID:   [id number]                 Room:         -

Gender:       M                        Technician:   lr

:          1971               Requested By: Lilian Wheeler 

Order Number: [id number]     Reading MD:   Panchito Brown

                                 Measurements

Intervals                              Axis          

Rate:         53                       P:            23

SC:           156                      QRS:          -12

QRSD:         106                      T:            1

QT:           456                                    

QTc:          431                                    

                           Interpretive Statements

SINUS BRADYCARDIA

INCOMPLETE RIGHT BUNDLE BRANCH BLOCK

SIMILAR TO PRIOR ON SAME DATE

Electronically Signed On 2017 19:43:00 EDT by Panchito Brown

## 2017-06-23 NOTE — REPUSA
MRI of the brain

clinical history: CVA

Technique: Multiecho multiplanar MRI images of the brain were obtained without administration of cont
rast. Diffusion weighted images with ADC mapping was also obtained.

The ventricles and sulci are symmetric bilaterally. The brain parenchyma demonstrates uniform and nor
mal signal on all sequences. There is no midline shift, mass effect, or extra-axial fluid collection.
 The midline intracranial structures do not demonstrate any gross abnormalities. The cervical cranial
 junction is intact. The orbits are unremarkable. The visualized paranasal sinuses and mastoid air ce
lls are clear. The osseous structures and superficial soft tissues are unremarkable. The vascular str
uctures demonstrate appropriate flow voids.

Impression: No acute findings.

     Electronically signed by JOVANY BALDERAS MD on 06/23/2017 07:51:38 PM ET

## 2017-06-23 NOTE — REPUSA
MRI of the brain

clinical history: CVA.

Technique: Time-of-flight MRA images of the brain were obtained without administration of contrast. 3
-D MIP images were also obtained.

Findings: The vascular structures extending from the distal carotid and vertebrobasilar arterial syst
ems, through the [place], demonstrate normal caliber and contour. There is no evidence of an
eurysm, stenosis, or thrombosis.

Impression: Unremarkable MRA examination of the brain.

     Electronically signed by JOVANY BALDERAS MD on 06/23/2017 07:52:57 PM ET

## 2017-06-23 NOTE — REP
CT of the brain without IV contrast:

 

Comparison 01/14/2017.

 

There is no hemorrhage.  The cortical stripe is unremarkable.  Ventricles are

normal size and midline.  There is no edema, mass effect or midline shift.  The

visualized paranasal sinuses and mastoid air cells are unremarkable.

 

Impression:

 

There is no hemorrhage, acute infarct or mass.  Negative CT scan of the brain.

No interval change.

 

 

Signed by

Quoc Nuñez MD 06/23/2017 01:42 P

## 2017-06-23 NOTE — HPE
DATE OF ADMISSION:  06/23/2017

 

PRIMARY CARE PROVIDER:  Amber Jones.

 

CODE STATUS:  Full code.

 

CHIEF COMPLAINT:  Brief aphasia with left arm paresthesias.

 

HISTORY OF PRESENT ILLNESS:

Mr. Claros is a 45-year-old gentleman with recurrent history of vague complaint

of left arm weakness, paresthesias, who has an appointment to see neurology

within the next 1-2 weeks, but was brought to the emergency department after

having brief episode of paresthesias, weakness, and aphasia. He stated when the

emergency medical services (EMS) presented to his house and bring him to the

emergency department that he did have garbled speech that was recognized by his

family members. That has since dissipated and he is no longer having any issues

other than the left arm paresthesias. He denies headache, lightheadedness,

dizziness, blurry vision, double vision. No other significant limitations, and he

has had good appetite. No sick contacts.

 

PAST MEDICAL HISTORY:

1. Asthma.

2. Seasonal allergies.

3. Gastroesophageal reflux disease (GERD).

 

PAST SURGICAL HISTORY: Unremarkable.

 

FAMILY HISTORY: Noncontributory.

 

SOCIAL HISTORY:

States he quit smoking several years ago. Denies any alcohol use. No illicit drug

use. No recent travel. No sick contacts.

 

ALLERGIES: No known drug allergies.

 

CURRENT MEDICATIONS:

- albuterol inhaler as directed

- DuoNeb every four hours as needed

- aspirin 325 mg daily

- Symbicort 160/4.5 mcg two inhalations twice a day

- calcium with vitamin D one tablet twice a day

- Zyrtec 10 mg daily

- Singulair 10 mg daily

- omeprazole 20 mg daily

- prednisone 20 mg daily which will need to be confirmed by pharmacy

- Spiriva one inhalation daily

REVIEW OF SYSTEMS:

CONSTITUTIONAL:  He denies fevers, chills, rigors.  No change in appetite or loss

of appetite.

HEENT: He denies headache, lightheadedness, dizziness, blurry vision or double

vision. He did have a brief episode of aphasia as outlined above which has since

dissipated. No difficulty with speech or swallowing.

PULMONARY: He denies productive sputum, cough or hemoptysis, although he does

have an underlying history of asthma.

CARDIOVASCULAR: No chest pain, paroxysmal nocturnal dyspnea (PND), orthopnea. No

lower extremity edema.

GASTROINTESTINAL (GI): No nausea, vomiting or diarrhea. Appetite is good. He

denies change in bowel habits. No hematochezia or melena.

GENITOURINARY (): No dysuria, frequency or hematuria.

MUSCULOSKELETAL: No bone, muscle or joint pain, swelling or erythema.

NEUROLOGIC: He does describe aphasia briefly and does have some ongoing

left-sided paresthesias.

LYMPHATIC: No lumps, bumps, swelling of the neck, axilla or groin. No weight

loss. No night sweats.

ENDOCRINE: Negative for diabetes. Negative for thyroid disorder.

HEMATOLOGIC: No bleeding or bruising sore. No prior history of venous

thromboembolism.

ONCOLOGIC: No history of cancer.

PSYCHIATRIC: No history of depression. No suicidal ideation. No auditory or

visual hallucinations.

10-point review of systems complete. Pertinent positives are listed.

 

PHYSICAL EXAMINATION:

VITAL SIGNS: Temperature is 97, pulse is 73, respiratory rate is 18, blood

pressure 111/55, SpO2 is 100% on room air.

GENERAL: The patient appears to be in no acute distress. He is alert and

oriented, pleasant.

HEENT: Head is atraumatic, normocephalic. Eyes pupils equal, round, and reactive

to light and accommodation. Throat clear. Mucous membranes moist. No slurred

speech. No facial droop.

NECK: Supple.

LUNGS: Clear.

HEART: Regular rate and rhythm.

ABDOMEN: Soft.

EXTREMITIES: Good  strength bilaterally. No limitations with range of motion.

Lower extremity show no edema, no calf tenderness.

NEUROLOGIC: Cranial nerves II through XII grossly intact. No motor or sensory

deficits noted.

 

LABORATORY DATA:

White count is 5.4, hemoglobin is 14.5, platelets 286,000. Sodium is 142,

potassium 4.0, chloride 107, bicarbonate 27, anion gap 8, BUN 18, creatinine

1.11, glucose 92.

 

IMAGING:

Head CT negative for hemorrhage or acute infarct or mass; otherwise no interval

change, no acute findings.

12-lead EKG is pending, as well as MRI, MRA, and 2-D echocardiogram.

 

PROBLEM LIST:

1. Transient ischemic attack (TIA) with brief aphasia which is resolved.

2. Left arm paresthesia.

3. Asthma.

4. Seasonal allergic rhinitis.

5. Gastroesophageal reflux disease (GERD).

 

PLAN:

The patient is admitted to the progressive care unit (PCU) on telemetry per Dr. Barrett. Consult Dr. Mendosa. MRI, MRA of the brain is pending. Two-dimensional

echocardiogram is pending. 12-lead electrocardiogram (EKG) is pending. Continue

with home medications. Will check a lipid profile in the morning. Aspirin 325 mg

daily. Physical therapy (PT)/occupational therapy (OT) evaluation and treatment.

Deep venous thrombosis (DVT) prophylaxis with Lovenox.

## 2017-06-24 NOTE — CR
DATE OF CONSULTATION: 06/24/2017

 

REFERRING PROVIDER: Dr. Stefano East

 

REASON FOR CONSULTATION: Possible transient ischemic attack (TIA).

 

HISTORY OF PRESENT ILLNESS:  The patient is 45-year-old male with past medical

history significant for gastric bypass surgery with approximately 200 pounds of

weight loss, presenting with sudden onset of severe dizziness.  The patient

states he has experienced dizziness without vertigo and states that again he is

having severe dizziness without vertigo.  He stated that his left arm started to

feel numb as well as bilateral legs.  He took a baby aspirin 325 mg.  He started

to feel as though he was leaning toward the left all of a sudden.  He could not

exactly describe the sensation changes.  His voice sounded different.  He was

brought via ambulance to [facility].  The patient did have a head

CT, which was negative.  His symptoms have improved in regard to the paresthesias

and speech changes; however, he still has difficulty with balance.  The patient

does have a history of sleep apnea and is noncompliant with treatment.

 

He, himself, does not have a history of hypertension, diabetes, or

hyperlipidemia.  He quit tobacco several years ago.

 

PAST MEDICAL HISTORY:

1.  Obstructive sleep apnea, noncompliant with continuous positive airway

pressure (CPAP) therapy.

2.  Morbid obesity. Has had gastric bypass surgery.

3.  Chronic dizziness.

 

PAST SURGICAL HISTORY: Gastric bypass surgery April 2016.

 

SOCIAL HISTORY:  The patient denies use of any tobacco, alcohol, or illicit drugs

but was a former smoker. The patient quit tobacco 21 years ago.

 

FAMILY HISTORY: Noncontributory.

 

REVIEW OF SYSTEMS:  A 14-point review of systems was obtained and is negative

except as per history of present illness (HPI).

 

PHYSICAL EXAMINATION: Blood pressure 111/55, pulse rate 65, respiratory rate is

18, 97 degrees Fahrenheit. Current height 6 feet 0 inches, current weight is 143

kg.  The patient is awake, alert, oriented to person, place, and time.  Speech,

language, comprehension, repetition are intact.  Pupils are 3 mm round, reactive

to light.  Extraocular movements are intact without nystagmus.  Sensation when

V1, V2, V3 is intact to light touch bilaterally.  No facial asymmetry on

activation.  Palate elevates symmetrically. Tongue is midline.  No weakness of

sternocleidomastoids. No pronator drift.  Strength is 5/5, including bilateral

deltoids, biceps, triceps, hand , iliopsoas, ____________________ tibialis.

Romberg testing is negative.  Sensory is intact to light touch and temperature

throughout all four extremities.  Deep tendon reflexes are 2+ throughout.

 

ASSESSMENT: Possible transient ischemic attacks (TIA) with episode of dizziness,

leaning toward the left, and left arm paresthesias. Bilateral lower extremity

paresthesias less likely. Could be related to a TIA.

 

PLAN Start aspirin 81 mg by mouth daily.  Obtain MRI brain, MR angiogram head and

neck without contrast. Continue telemetry monitoring since symptoms have

essentially improved with treatment of meclizine. Continue observation per 24

hours. If MRI is normal, the patient can be seen in the outpatient clinic within

the next month.  Can consider checking a B12 level.

## 2017-06-24 NOTE — DSES
DATE OF ADMISSION:  06/23/2017

DATE OF DISCHARGE:  06/24/2017

 

ATTENDING PHYSICIAN:  Luba Barrett MD

 

PRIMARY CARE PROVIDER:  MAYITO Castellanos

 

REFERRING PHYSICIAN:   None.

 

CONSULTING PHYSICIAN:  Epi Mendosa MD, of neurology

 

CONDITION ON DISCHARGE:  Stable.

 

FINAL DIAGNOSIS:  Dizziness/possible transient ischemic attack (TIA).

 

PROCEDURES:  None.

 

HISTORY OF PRESENT ILLNESS:  The patient is a 45-year-old  male with

past medical history of asthma, seasonal allergies, and gastroesophageal reflux

disease (GERD), who presented to the emergency room (ER) with complaints of left

arm weakness, paresthesias, and some slurred speech at home.  The patient was

scheduled to see neurology in 1-2 weeks but presented to the emergency room after

he had presented with these symptoms.  Family members had brought him to the

emergency room because of symptoms that they had noted at home.  Upon arrival of

emergency medical services (EMS) to the home, the patient's symptoms had

resolved.  The patient was admitted for possible TIA.

 

HOSPITAL COURSE:

1.  Left arm weakness, numbness, and slurred speech associated with dizziness,

possibly secondary to TIA, possibly secondary to illness.  The patient had

presented with weakness of his arm on the left side but has improved.  Physical

reveals full 5/5 muscle strength.  Imaging done in the emergency room of MRI of

his head and MRA of his head was negative.  The patient has been evaluated by

neurology.  Has been cleared for discharge with aspirin 81 mg to be taken at

home.  The patient has been advised to followup with neurology as an outpatient,

as well as his primary care provider.  The patient was discharged home with

meclizine.  The patient had received a dose of meclizine, and his dizziness has

resolved.

 

2.  Asthma.  Continue with current inhaled therapy.

 

3.  Seasonal allergic rhinitis.  Continue with cetirizine.

 

4.  Gastroesophageal reflux disease.  Continue with proton pump inhibitor (PPI).

 

DISCHARGE MEDICATIONS:  The patient will be discharged home with the following

medications:

- albuterol two puffs inhaled twice a day as needed shortness of breath

- Symbicort two puffs inhaled twice a day

- calcium one tablet by mouth twice a day

- cetirizine 10 mg by mouth daily

- Montelukast 10 mg by mouth daily

- omeprazole 20 mg by mouth daily

- Spiriva one inhalation daily

 

Medications stopped include:

- aspirin 325

- prednisone 20 mg daily

 

New medications prescribed include:

- aspirin 81 mg by mouth every day

- meclizine 25 mg by mouth every 12 hours as needed dizziness

 

DISCHARGE INSTRUCTIONS:  The patient was advised to followup with his primary

care provider and neurology within the next 7 days.  He has been advised to

remain compliant with treatment and medications and return to the emergency room

if he experiences any problems.

 

TIME SPENT ON DISCHARGE:  35 minutes.

## 2017-06-24 NOTE — ECHO
DATE OF PROCEDURE:  06/24/2017

 

REFERRING PROVIDER: Dr. Stefano East.

 

PATIENT LOCATION:  Room 3211

 

REASON FOR THE ECHOCARDIOGRAM:  TIA.

 

2D MEASUREMENT:

IVS - 1.2 cm

LV - 5.1 cm

LVPW - 1.2 cm

LA - 3.5 cm

Aorta - 3.5 cm

IVC - 2.4 cm

 

DOPPLER MEASUREMENTS:

Peak velocity across the aortic valve - 1.4 m/s

Peak velocity across the LVOT - 1.0 m/s

Mitral E - 0.66, Cami A - 0.47 with ratio of 1.4

 

2D COMMENTS:

1.  Normal left ventricular size, wall thickness and normal global left

ventricular systolic function.  Left ventricular systolic ejection fraction is

estimated at 60%.

 

2.  Normal left atrium.  Normal right atrium and right ventricle.

 

3.  The atrial septum appears to be normal without evidence of defect or shunt.

 

4.  Normal aortic root.

 

5.  No pericardial effusion seen.

 

6.  Normal aortic valve, mitral valve, tricuspid valve and pulmonic valve. The

proximal pulmonary artery branches were not well visualized.

 

7.  The inferior vena cava was mildly enlarged. Central venous pressure might

then be elevated.

 

DOPPLER:

Only trace mitral regurgitation detected.  Assessment of the left

ventricular diastolic function appeared to be normal.

 

IMPRESSION:

1.  Normal global left ventricular systolic and diastolic function.

2.  No significant valvular heart disease but trace mitral regurgitation.

3.  The inferior vena cava/IVC was mildly enlarged.  Central venous pressure 
might

be elevated.

MTDD

## 2017-06-26 NOTE — ECGEPIP
Stationary ECG Study

                           [facility] - ED

                                       

                                       Test Date:    2017

Pat Name:     TANIKA GUSMAN              Department:   

Patient ID:   [id number]                 Room:         -

Gender:       M                        Technician:   [email or web address]

:          1971               Requested By: Sean Ortega PA-C

Order Number: [id number]     Reading MD:   Lilian Wheeler

                                 Measurements

Intervals                              Axis          

Rate:         51                       P:            23

ME:           166                      QRS:          -13

QRSD:         110                      T:            -2

QT:           430                                    

QTc:          398                                    

                           Interpretive Statements

SINUS BRADYCARDIA

SIMILAR 17

Electronically Signed On 2017 20:05:48 EDT by Lilian Wheeler

## 2017-06-26 NOTE — ECGEPIP
Stationary ECG Study

                              [facility]

                                       

                                       Test Date:    2017

Pat Name:     TANIKA GUSMAN              Department:   

Patient ID:   [id number]                 Room:         [place] [address]

Gender:       M                        Technician:   ADIEL

:          1971               Requested By: NIK KURTZ

Order Number: [id number]     Reading MD:   Pipe Andrew

                                 Measurements

Intervals                              Axis          

Rate:         57                       P:            54

MT:           164                      QRS:          -12

QRSD:         106                      T:            15

QT:           431                                    

QTc:          420                                    

                           Interpretive Statements

SINUS BRADYCARDIA

LOW QRS VOLTAGE IN PRECORDIAL LEADS

Compared to the last 2 tracings, no significant changes

Electronically Signed On 2017 0:51:35 EDT by Pipe Andrew

## 2020-09-01 NOTE — REP
Chest one-view

 

HISTORY: Chest pain

 

Comparison: 03/12/2017

 

The lungs are clear.  The heart is normal in size.  The pulmonary vasculature is

normal in appearance.

 

Impression:  No acute disease.

 

 

Signed by

Amado Larson MD 06/16/2017 12:39 P Spoke to pt, informed Dr Go will be there about 10:30 am for surgery. States understanding, thanks me for the call.